# Patient Record
Sex: FEMALE | Race: BLACK OR AFRICAN AMERICAN | NOT HISPANIC OR LATINO | Employment: FULL TIME | URBAN - METROPOLITAN AREA
[De-identification: names, ages, dates, MRNs, and addresses within clinical notes are randomized per-mention and may not be internally consistent; named-entity substitution may affect disease eponyms.]

---

## 2023-12-19 ENCOUNTER — TELEPHONE (OUTPATIENT)
Dept: OBSTETRICS AND GYNECOLOGY | Facility: CLINIC | Age: 48
End: 2023-12-19

## 2024-04-13 ENCOUNTER — APPOINTMENT (OUTPATIENT)
Dept: RADIOLOGY | Facility: HOSPITAL | Age: 49
End: 2024-04-13
Payer: COMMERCIAL

## 2024-04-13 ENCOUNTER — HOSPITAL ENCOUNTER (EMERGENCY)
Facility: HOSPITAL | Age: 49
Discharge: HOME | End: 2024-04-13
Attending: GENERAL PRACTICE
Payer: COMMERCIAL

## 2024-04-13 VITALS
HEART RATE: 65 BPM | SYSTOLIC BLOOD PRESSURE: 142 MMHG | OXYGEN SATURATION: 100 % | TEMPERATURE: 98.3 F | DIASTOLIC BLOOD PRESSURE: 80 MMHG | RESPIRATION RATE: 15 BRPM

## 2024-04-13 DIAGNOSIS — R00.2 PALPITATIONS: Primary | ICD-10-CM

## 2024-04-13 LAB
ALBUMIN SERPL BCP-MCNC: 4 G/DL (ref 3.4–5)
ALP SERPL-CCNC: 53 U/L (ref 33–110)
ALT SERPL W P-5'-P-CCNC: 16 U/L (ref 7–45)
ANION GAP SERPL CALC-SCNC: 13 MMOL/L (ref 10–20)
AST SERPL W P-5'-P-CCNC: 21 U/L (ref 9–39)
B-HCG SERPL-ACNC: <2 MIU/ML
BASOPHILS # BLD AUTO: 0.03 X10*3/UL (ref 0–0.1)
BASOPHILS NFR BLD AUTO: 0.4 %
BILIRUB SERPL-MCNC: 0.3 MG/DL (ref 0–1.2)
BUN SERPL-MCNC: 8 MG/DL (ref 6–23)
CALCIUM SERPL-MCNC: 9.1 MG/DL (ref 8.6–10.3)
CARDIAC TROPONIN I PNL SERPL HS: <3 NG/L (ref 0–13)
CHLORIDE SERPL-SCNC: 106 MMOL/L (ref 98–107)
CO2 SERPL-SCNC: 22 MMOL/L (ref 21–32)
CREAT SERPL-MCNC: 0.66 MG/DL (ref 0.5–1.05)
D DIMER PPP FEU-MCNC: 607 NG/ML FEU
EGFRCR SERPLBLD CKD-EPI 2021: >90 ML/MIN/1.73M*2
EOSINOPHIL # BLD AUTO: 0.1 X10*3/UL (ref 0–0.7)
EOSINOPHIL NFR BLD AUTO: 1.5 %
ERYTHROCYTE [DISTWIDTH] IN BLOOD BY AUTOMATED COUNT: 12.1 % (ref 11.5–14.5)
GLUCOSE SERPL-MCNC: 122 MG/DL (ref 74–99)
HCT VFR BLD AUTO: 36.4 % (ref 36–46)
HGB BLD-MCNC: 12.2 G/DL (ref 12–16)
IMM GRANULOCYTES # BLD AUTO: 0.01 X10*3/UL (ref 0–0.7)
IMM GRANULOCYTES NFR BLD AUTO: 0.1 % (ref 0–0.9)
LYMPHOCYTES # BLD AUTO: 3.42 X10*3/UL (ref 1.2–4.8)
LYMPHOCYTES NFR BLD AUTO: 51.2 %
MCH RBC QN AUTO: 29.3 PG (ref 26–34)
MCHC RBC AUTO-ENTMCNC: 33.5 G/DL (ref 32–36)
MCV RBC AUTO: 87 FL (ref 80–100)
MONOCYTES # BLD AUTO: 0.51 X10*3/UL (ref 0.1–1)
MONOCYTES NFR BLD AUTO: 7.6 %
NEUTROPHILS # BLD AUTO: 2.61 X10*3/UL (ref 1.2–7.7)
NEUTROPHILS NFR BLD AUTO: 39.2 %
NRBC BLD-RTO: 0 /100 WBCS (ref 0–0)
PLATELET # BLD AUTO: 250 X10*3/UL (ref 150–450)
POTASSIUM SERPL-SCNC: 3.5 MMOL/L (ref 3.5–5.3)
PROT SERPL-MCNC: 7.7 G/DL (ref 6.4–8.2)
RBC # BLD AUTO: 4.17 X10*6/UL (ref 4–5.2)
SODIUM SERPL-SCNC: 137 MMOL/L (ref 136–145)
WBC # BLD AUTO: 6.7 X10*3/UL (ref 4.4–11.3)

## 2024-04-13 PROCEDURE — 85025 COMPLETE CBC W/AUTO DIFF WBC: CPT | Performed by: GENERAL PRACTICE

## 2024-04-13 PROCEDURE — 99285 EMERGENCY DEPT VISIT HI MDM: CPT | Mod: 25 | Performed by: GENERAL PRACTICE

## 2024-04-13 PROCEDURE — 71275 CT ANGIOGRAPHY CHEST: CPT | Performed by: SURGERY

## 2024-04-13 PROCEDURE — 36415 COLL VENOUS BLD VENIPUNCTURE: CPT | Performed by: GENERAL PRACTICE

## 2024-04-13 PROCEDURE — 2550000001 HC RX 255 CONTRASTS: Performed by: GENERAL PRACTICE

## 2024-04-13 PROCEDURE — 84702 CHORIONIC GONADOTROPIN TEST: CPT | Performed by: GENERAL PRACTICE

## 2024-04-13 PROCEDURE — 80053 COMPREHEN METABOLIC PANEL: CPT | Performed by: GENERAL PRACTICE

## 2024-04-13 PROCEDURE — 71045 X-RAY EXAM CHEST 1 VIEW: CPT

## 2024-04-13 PROCEDURE — 84484 ASSAY OF TROPONIN QUANT: CPT | Performed by: GENERAL PRACTICE

## 2024-04-13 PROCEDURE — 85379 FIBRIN DEGRADATION QUANT: CPT | Performed by: GENERAL PRACTICE

## 2024-04-13 PROCEDURE — 71045 X-RAY EXAM CHEST 1 VIEW: CPT | Performed by: SURGERY

## 2024-04-13 PROCEDURE — 71275 CT ANGIOGRAPHY CHEST: CPT

## 2024-04-13 RX ADMIN — IOHEXOL 65 ML: 350 INJECTION, SOLUTION INTRAVENOUS at 04:45

## 2024-04-13 ASSESSMENT — PAIN SCALES - GENERAL
PAINLEVEL_OUTOF10: 0 - NO PAIN
PAINLEVEL_OUTOF10: 0 - NO PAIN

## 2024-04-13 ASSESSMENT — PAIN - FUNCTIONAL ASSESSMENT: PAIN_FUNCTIONAL_ASSESSMENT: 0-10

## 2024-04-13 NOTE — ED TRIAGE NOTES
Pt arrived to the ED via [facility] coming from home with a chief complaint of dizziness and heart palpitations. Pt endorses a hx of SVT. Sinus rythym on the monitor and 12 lead for EMS. Pt is currently on hormone replacement therapy. Allergic to ASA and Tylenol. Denies chest pain. No changes in mental status. Bgl 141.

## 2024-04-13 NOTE — ED PROVIDER NOTES
HPI   Chief Complaint   Patient presents with    Dizziness       HPI: 48-year-old female with a history of SVT presents for palpitations and mild shortness of breath.  She had 2-3 episodes of palpitations earlier tonight which is what led to her coming to the ED.  She is currently on estrogen/progesterone.  She denies chest pain, syncope but does report lightheadedness during these episodes.  On presentation she is in the normal sinus rhythm and feels well at rest.      Limitations to history: None  Independent Historians: Patient  External Records Reviewed: HIE, outpatient notes, inpatient notes  ------------------------------------------------------------------------------------------------------------------------------------------  ROS: a ten point review of systems was performed and was negative except as per HPI.  ------------------------------------------------------------------------------------------------------------------------------------------  PMH / PSH: as per HPI, otherwise reviewed in EMR  MEDS: as per HPI, otherwise reviewed in EMR  ALLERGIES: as per HPI, otherwise reviewed in EMR  SocH:  as per HPI, otherwise reviewed in EMR  FH:  as per HPI, otherwise reviewed in EMR  ------------------------------------------------------------------------------------------------------------------------------------------  Physical Exam:  VS: As documented in the triage note and EMR flowsheet from this visit was reviewed  General: Well appearing. No acute distress.   Eyes:  Extraocular movements grossly intact. No scleral icterus. No discharge  HEENT:  Normocephalic.  Atraumatic  Neck: Moves neck freely. No gross masses  CV: Regular rhythm. No murmurs, rubs or gallops   Resp: Clear to auscultation bilaterally. No respiratory distress.    GI: Soft, no masses, nontender. No rebound tenderness or guarding  MSK: Symmetric muscle bulk. No deformities. No lower extremity edema.    Skin: Warm, dry, intact.   Neuro: No  focal deficits.  A&O x3.   Psych: Appropriate for situation  ------------------------------------------------------------------------------------------------------------------------------------------  Hospital Course / Medical Decision Making:  Independent Interpretations: Chest xray, CT chest  EKG as interpreted by me: Normal sinus rhythm at 80 bpm with a right bundle branch block and no signs of acute ischemia.    MDM: 48-year-old female with a history of SVT presents for palpitations mild shortness of breath.  She has no signs of ectopy on presentation.  Troponin nonelevated.  No major abnormalities on CBC or CMP.  D-dimer mildly elevated.  CT is negative for pulmonary embolism.  Chest x-ray shows findings of an opacity around the left costophrenic angle.  The patient has no cough and I feel that pneumonia is unlikely.  She feels safe going home and was provided with copies of her imaging reports.  She will follow-up with her primary care physician as soon as possible and will return to the ED for any concerning symptoms    Discussion of Management with Other Providers:   I discussed the patient/results with: Emergency medicine team    Final diagnosis and disposition as below.    Results for orders placed or performed during the hospital encounter of 04/13/24  -CBC and Auto Differential:        Result                      Value             Ref Range           WBC                         6.7               4.4 - 11.3 x*       nRBC                        0.0               0.0 - 0.0 /1*       RBC                         4.17              4.00 - 5.20 *       Hemoglobin                  12.2              12.0 - 16.0 *       Hematocrit                  36.4              36.0 - 46.0 %       MCV                         87                80 - 100 fL         MCH                         29.3              26.0 - 34.0 *       MCHC                        33.5              32.0 - 36.0 *       RDW                         12.1               11.5 - 14.5 %       Platelets                   250               150 - 450 x1*       Neutrophils %               39.2              40.0 - 80.0 %       Immature Granulocytes *     0.1               0.0 - 0.9 %         Lymphocytes %               51.2              13.0 - 44.0 %       Monocytes %                 7.6               2.0 - 10.0 %        Eosinophils %               1.5               0.0 - 6.0 %         Basophils %                 0.4               0.0 - 2.0 %         Neutrophils Absolute        2.61              1.20 - 7.70 *       Immature Granulocytes *     0.01              0.00 - 0.70 *       Lymphocytes Absolute        3.42              1.20 - 4.80 *       Monocytes Absolute          0.51              0.10 - 1.00 *       Eosinophils Absolute        0.10              0.00 - 0.70 *       Basophils Absolute          0.03              0.00 - 0.10 *  -Comprehensive metabolic panel:        Result                      Value             Ref Range           Glucose                     122 (H)           74 - 99 mg/dL       Sodium                      137               136 - 145 mm*       Potassium                   3.5               3.5 - 5.3 mm*       Chloride                    106               98 - 107 mmo*       Bicarbonate                 22                21 - 32 mmol*       Anion Gap                   13                10 - 20 mmol*       Urea Nitrogen               8                 6 - 23 mg/dL        Creatinine                  0.66              0.50 - 1.05 *       eGFR                        >90               >60 mL/min/1*       Calcium                     9.1               8.6 - 10.3 m*       Albumin                     4.0               3.4 - 5.0 g/*       Alkaline Phosphatase        53                33 - 110 U/L        Total Protein               7.7               6.4 - 8.2 g/*       AST                         21                9 - 39 U/L          Bilirubin, Total            0.3                0.0 - 1.2 mg*       ALT                         16                7 - 45 U/L     -Troponin I, High Sensitivity:        Result                      Value             Ref Range           Troponin I, High Sensi*     <3                0 - 13 ng/L    -Human Chorionic Gonadotropin, Serum Quantitative:        Result                      Value             Ref Range           HCG, Beta-Quantitative      <2                <5 mIU/mL      -D-dimer, quantitative:        Result                      Value             Ref Range           D-Dimer Non VTE, Quant*     607 (H)           <=500 ng/mL *  CT angio chest for pulmonary embolism   Final Result    No evidence of acute pulmonary embolism to  proximal segmental level.    Please note that, assessment of distal segmental and subsegmental    branches is limited and small peripheral emboli are not entirely    excluded.          No definite evidence of acute pathology in the chest.          Mild cardiomegaly.          Mild thoracic aortic atherosclerosis.          Cholecystectomy.          Additional findings as discussed above.          MACRO:    None          Signed by: Armin Rico 4/13/2024 5:24 AM    Dictation workstation:   [id number]     XR chest 1 view   Final Result    1.  There is mild asymmetric patchy opacity overlying the lung about    the left lateral costophrenic angle, that could relate to asymmetric    superimposed chest wall soft tissue, atelectasis or pneumonia. Lungs    otherwise appear clear. No pleural effusion or pneumothorax                      MACRO:    None          Signed by: Armin Rico 4/13/2024 4:16 AM    Dictation workstation:   [id number]                                 No data recorded                   Patient History   Past Medical History:   Diagnosis Date    Cervicalgia 06/24/2013    Neck pain    Cough, unspecified 10/11/2014    Cough    Cough, unspecified 05/25/2013    Cough    Effusion, unspecified knee 03/29/2014    Effusion of knee     Elevated blood-pressure reading, without diagnosis of hypertension 11/02/2016    Blood pressure elevated without history of HTN    Encounter for screening for lipoid disorders 12/31/2013    Encounter for screening for lipoid disorders    Furuncle of back (any part, except buttock) 01/05/2015    Boil, back    Hemorrhage, not elsewhere classified 04/04/2013    Ecchymosis    Interstitial cystitis (chronic) without hematuria 10/03/2018    Interstitial cystitis    Other disturbances of skin sensation 04/04/2013    Burning sensation    Other specified disorders of muscle 09/01/2021    Pelvic floor dysfunction    Pain in unspecified lower leg 04/04/2013    Pain, lower leg    Palpitations 12/31/2013    Palpitations    Personal history of other diseases of the digestive system     History of pancreatitis    Personal history of other diseases of the musculoskeletal system and connective tissue 06/24/2013    History of backache    Rash and other nonspecific skin eruption 03/29/2014    Facial rash     Past Surgical History:   Procedure Laterality Date    CHOLECYSTECTOMY  11/02/2016    Cholecystectomy     No family history on file.  Social History     Tobacco Use    Smoking status: Not on file    Smokeless tobacco: Not on file   Substance Use Topics    Alcohol use: Not on file    Drug use: Not on file       Physical Exam   ED Triage Vitals [04/13/24 0217]   Temperature Heart Rate Respirations BP   36.8 °C (98.3 °F) 88 18 146/80      Pulse Ox Temp Source Heart Rate Source Patient Position   100 % Oral Monitor Sitting      BP Location FiO2 (%)     Left arm --       Physical Exam    ED Course & MDM   Diagnoses as of 04/20/24 1250   Palpitations       Medical Decision Making      Procedure  Procedures     Erich Acharya,   04/20/24 1256

## 2024-04-18 DIAGNOSIS — N95.2 VAGINAL ATROPHY: Primary | ICD-10-CM

## 2024-04-18 RX ORDER — ESTRADIOL 0.1 MG/G
2 CREAM VAGINAL NIGHTLY
Qty: 34 G | Refills: 3 | Status: SHIPPED | OUTPATIENT
Start: 2024-04-18 | End: 2025-04-18

## 2024-05-15 ENCOUNTER — OFFICE VISIT (OUTPATIENT)
Dept: OPHTHALMOLOGY | Facility: CLINIC | Age: 49
End: 2024-05-15
Payer: COMMERCIAL

## 2024-05-15 DIAGNOSIS — H25.813 COMBINED FORMS OF AGE-RELATED CATARACT OF BOTH EYES: ICD-10-CM

## 2024-05-15 DIAGNOSIS — H52.03 HYPERMETROPIA OF BOTH EYES: Primary | ICD-10-CM

## 2024-05-15 DIAGNOSIS — H52.4 PRESBYOPIA: ICD-10-CM

## 2024-05-15 DIAGNOSIS — H52.223 REGULAR ASTIGMATISM OF BOTH EYES: ICD-10-CM

## 2024-05-15 DIAGNOSIS — T26.62XD: ICD-10-CM

## 2024-05-15 DIAGNOSIS — T26.61XD: ICD-10-CM

## 2024-05-15 DIAGNOSIS — T54.3X1D: ICD-10-CM

## 2024-05-15 PROBLEM — J06.9 ACUTE UPPER RESPIRATORY INFECTION: Status: ACTIVE | Noted: 2019-05-17

## 2024-05-15 PROBLEM — G89.29 CHRONIC RIGHT-SIDED LOW BACK PAIN WITHOUT SCIATICA: Status: ACTIVE | Noted: 2024-05-15

## 2024-05-15 PROBLEM — I10 BENIGN ESSENTIAL HYPERTENSION: Status: ACTIVE | Noted: 2024-05-15

## 2024-05-15 PROBLEM — M22.41 CHONDROMALACIA OF RIGHT PATELLOFEMORAL JOINT: Status: ACTIVE | Noted: 2019-07-17

## 2024-05-15 PROBLEM — N30.10 INTERSTITIAL CYSTITIS: Status: ACTIVE | Noted: 2019-05-17

## 2024-05-15 PROBLEM — E87.6 HYPOKALEMIA: Status: ACTIVE | Noted: 2017-10-24

## 2024-05-15 PROBLEM — E66.9 OBESITY: Status: ACTIVE | Noted: 2024-05-15

## 2024-05-15 PROBLEM — R45.86 MOOD CHANGES: Status: ACTIVE | Noted: 2024-05-15

## 2024-05-15 PROBLEM — F41.0 PANIC DISORDER WITHOUT AGORAPHOBIA: Status: ACTIVE | Noted: 2024-05-15

## 2024-05-15 PROBLEM — E86.1 HYPOVOLEMIA: Status: ACTIVE | Noted: 2019-05-17

## 2024-05-15 PROBLEM — N89.8 VAGINAL IRRITATION: Status: ACTIVE | Noted: 2024-05-15

## 2024-05-15 PROBLEM — R20.2 PARESTHESIA OF FOOT: Status: ACTIVE | Noted: 2024-05-15

## 2024-05-15 PROBLEM — R76.8 ANA POSITIVE: Status: ACTIVE | Noted: 2024-05-15

## 2024-05-15 PROBLEM — M79.10 MUSCLE PAIN: Status: ACTIVE | Noted: 2024-05-15

## 2024-05-15 PROBLEM — R10.9 ABDOMINAL PAIN: Status: ACTIVE | Noted: 2019-05-17

## 2024-05-15 PROBLEM — M54.50 CHRONIC RIGHT-SIDED LOW BACK PAIN WITHOUT SCIATICA: Status: ACTIVE | Noted: 2024-05-15

## 2024-05-15 PROBLEM — M47.816 SPONDYLOSIS OF LUMBAR SPINE: Status: ACTIVE | Noted: 2024-05-15

## 2024-05-15 PROBLEM — R42 DIZZINESS: Status: ACTIVE | Noted: 2019-05-17

## 2024-05-15 PROBLEM — R00.2 PALPITATIONS: Status: ACTIVE | Noted: 2019-05-17

## 2024-05-15 PROBLEM — N95.1 HOT FLASHES DUE TO MENOPAUSE: Status: ACTIVE | Noted: 2024-05-15

## 2024-05-15 PROBLEM — N95.2 VAGINAL ATROPHY: Status: ACTIVE | Noted: 2024-05-15

## 2024-05-15 PROBLEM — R31.9 HEMATURIA: Status: ACTIVE | Noted: 2019-05-17

## 2024-05-15 PROBLEM — M62.89 PELVIC FLOOR DYSFUNCTION: Status: ACTIVE | Noted: 2024-05-15

## 2024-05-15 PROBLEM — R76.8 POSITIVE ANTINUCLEAR ANTIBODY: Status: ACTIVE | Noted: 2024-05-15

## 2024-05-15 PROBLEM — I47.10 PAROXYSMAL SVT (SUPRAVENTRICULAR TACHYCARDIA) (CMS-HCC): Status: ACTIVE | Noted: 2019-05-17

## 2024-05-15 PROBLEM — R53.81 MALAISE AND FATIGUE: Status: ACTIVE | Noted: 2019-05-17

## 2024-05-15 PROBLEM — R52 GENERALIZED PAIN: Status: ACTIVE | Noted: 2019-07-08

## 2024-05-15 PROBLEM — J30.9 ALLERGIC RHINITIS: Status: ACTIVE | Noted: 2024-05-15

## 2024-05-15 PROBLEM — L30.9 ECZEMA: Status: ACTIVE | Noted: 2024-05-15

## 2024-05-15 PROBLEM — R55 SYNCOPE AND COLLAPSE: Status: ACTIVE | Noted: 2019-05-17

## 2024-05-15 PROBLEM — K21.9 ESOPHAGEAL REFLUX: Status: ACTIVE | Noted: 2024-05-15

## 2024-05-15 PROBLEM — J90 PLEURAL EFFUSION: Status: ACTIVE | Noted: 2019-05-17

## 2024-05-15 PROBLEM — E78.00 PURE HYPERCHOLESTEROLEMIA: Status: ACTIVE | Noted: 2019-05-17

## 2024-05-15 PROBLEM — N92.1 MENORRHAGIA WITH IRREGULAR CYCLE: Status: ACTIVE | Noted: 2024-05-15

## 2024-05-15 PROBLEM — R53.83 MALAISE AND FATIGUE: Status: ACTIVE | Noted: 2019-05-17

## 2024-05-15 PROBLEM — D64.9 ANEMIA: Status: ACTIVE | Noted: 2024-05-15

## 2024-05-15 PROBLEM — R10.2 FEMALE PELVIC PAIN: Status: ACTIVE | Noted: 2024-05-15

## 2024-05-15 PROBLEM — R20.8 BURNING SENSATION: Status: ACTIVE | Noted: 2024-05-15

## 2024-05-15 PROBLEM — R79.89 LOW VITAMIN D LEVEL: Status: ACTIVE | Noted: 2024-05-15

## 2024-05-15 PROBLEM — G47.00 INSOMNIA: Status: ACTIVE | Noted: 2024-05-15

## 2024-05-15 PROBLEM — I10 HYPERTENSION: Status: ACTIVE | Noted: 2019-05-17

## 2024-05-15 PROBLEM — R35.0 INCREASED FREQUENCY OF URINATION: Status: ACTIVE | Noted: 2019-05-17

## 2024-05-15 PROBLEM — N94.6 DYSMENORRHEA: Status: ACTIVE | Noted: 2024-05-15

## 2024-05-15 PROBLEM — N39.0 URINARY TRACT INFECTIOUS DISEASE: Status: ACTIVE | Noted: 2019-05-17

## 2024-05-15 PROBLEM — F41.1 GENERALIZED ANXIETY DISORDER: Status: ACTIVE | Noted: 2021-02-17

## 2024-05-15 PROBLEM — R58 ECCHYMOSIS: Status: ACTIVE | Noted: 2024-05-15

## 2024-05-15 PROBLEM — H93.19 TINNITUS: Status: ACTIVE | Noted: 2019-05-17

## 2024-05-15 PROBLEM — F41.9 ANXIETY: Status: ACTIVE | Noted: 2024-05-15

## 2024-05-15 PROBLEM — R51.9 HEADACHE: Status: ACTIVE | Noted: 2019-05-17

## 2024-05-15 PROBLEM — N95.1 PERIMENOPAUSAL: Status: ACTIVE | Noted: 2021-03-24

## 2024-05-15 PROBLEM — N39.3 FEMALE STRESS INCONTINENCE: Status: ACTIVE | Noted: 2019-05-17

## 2024-05-15 PROBLEM — K11.7 DISTURBANCE OF SALIVARY SECRETION: Status: ACTIVE | Noted: 2019-05-17

## 2024-05-15 PROBLEM — R05.9 COUGH: Status: ACTIVE | Noted: 2019-05-17

## 2024-05-15 PROCEDURE — 92004 COMPRE OPH EXAM NEW PT 1/>: CPT | Performed by: OPTOMETRIST

## 2024-05-15 PROCEDURE — 92015 DETERMINE REFRACTIVE STATE: CPT | Performed by: OPTOMETRIST

## 2024-05-15 RX ORDER — AMLODIPINE BESYLATE 5 MG/1
5 TABLET ORAL
COMMUNITY
Start: 2021-09-08

## 2024-05-15 RX ORDER — FLUTICASONE PROPIONATE 50 MCG
1 SPRAY, SUSPENSION (ML) NASAL
COMMUNITY
Start: 2018-06-13

## 2024-05-15 RX ORDER — PROGESTERONE 100 MG/1
100 CAPSULE ORAL NIGHTLY
COMMUNITY

## 2024-05-15 RX ORDER — VIT C/E/ZN/COPPR/LUTEIN/ZEAXAN 250MG-90MG
1 CAPSULE ORAL DAILY
COMMUNITY
Start: 2015-05-14

## 2024-05-15 RX ORDER — METOPROLOL TARTRATE 25 MG/1
TABLET, FILM COATED ORAL
COMMUNITY

## 2024-05-15 RX ORDER — CETIRIZINE HYDROCHLORIDE 10 MG/1
10 TABLET ORAL
COMMUNITY
Start: 2024-03-20

## 2024-05-15 ASSESSMENT — VISUAL ACUITY
OD_CC: 20/20
METHOD: SNELLEN - LINEAR
OS_CC: 20/25
CORRECTION_TYPE: GLASSES

## 2024-05-15 ASSESSMENT — REFRACTION_WEARINGRX
OS_AXIS: 080
OS_SPHERE: +0.25
OS_ADD: +1.50
OD_SPHERE: +0.75
OD_CYLINDER: -0.50
OS_CYLINDER: -0.25
OD_ADD: +1.50
OD_AXIS: 080

## 2024-05-15 ASSESSMENT — ENCOUNTER SYMPTOMS
ALLERGIC/IMMUNOLOGIC NEGATIVE: 0
CONSTITUTIONAL NEGATIVE: 0
EYES NEGATIVE: 0
CARDIOVASCULAR NEGATIVE: 0
MUSCULOSKELETAL NEGATIVE: 0
NEUROLOGICAL NEGATIVE: 0
HEMATOLOGIC/LYMPHATIC NEGATIVE: 0
RESPIRATORY NEGATIVE: 0
GASTROINTESTINAL NEGATIVE: 0
PSYCHIATRIC NEGATIVE: 0
ENDOCRINE NEGATIVE: 0

## 2024-05-15 ASSESSMENT — REFRACTION_MANIFEST
OS_SPHERE: +0.25
METHOD_AUTOREFRACTION: 1
OD_AXIS: 060
OD_CYLINDER: -0.50
OS_ADD: +1.00
OD_AXIS: 060
OD_SPHERE: +0.75
OS_CYLINDER: SPHERE
OS_SPHERE: +0.25
OD_CYLINDER: -0.50
OS_CYLINDER: SPHERE
OD_ADD: +1.00
OD_SPHERE: +0.75

## 2024-05-15 ASSESSMENT — CONF VISUAL FIELD
OD_NORMAL: 1
OS_NORMAL: 1
OD_SUPERIOR_NASAL_RESTRICTION: 0
METHOD: COUNTING FINGERS
OS_SUPERIOR_NASAL_RESTRICTION: 0
OD_INFERIOR_NASAL_RESTRICTION: 0
OD_SUPERIOR_TEMPORAL_RESTRICTION: 0
OS_INFERIOR_NASAL_RESTRICTION: 0
OS_SUPERIOR_TEMPORAL_RESTRICTION: 0
OD_INFERIOR_TEMPORAL_RESTRICTION: 0
OS_INFERIOR_TEMPORAL_RESTRICTION: 0

## 2024-05-15 ASSESSMENT — REFRACTION
OD_CYLINDER: -0.50
OD_SPHERE: +0.75
OS_SPHERE: +0.50
OS_ADD: +1.00
OD_AXIS: 060
OS_CYLINDER: SPHERE
OD_ADD: +1.00

## 2024-05-15 ASSESSMENT — TONOMETRY
OD_IOP_MMHG: 20
IOP_METHOD: GOLDMANN APPLANATION
OS_IOP_MMHG: 21

## 2024-05-15 ASSESSMENT — CUP TO DISC RATIO
OS_RATIO: 0.35
OD_RATIO: 0.35

## 2024-05-15 ASSESSMENT — EXTERNAL EXAM - RIGHT EYE: OD_EXAM: NORMAL

## 2024-05-15 ASSESSMENT — SLIT LAMP EXAM - LIDS
COMMENTS: NORMAL
COMMENTS: NORMAL

## 2024-05-15 ASSESSMENT — EXTERNAL EXAM - LEFT EYE: OS_EXAM: NORMAL
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
## 2024-05-21 ENCOUNTER — APPOINTMENT (OUTPATIENT)
Dept: CARDIOLOGY | Facility: HOSPITAL | Age: 49
End: 2024-05-21
Payer: COMMERCIAL

## 2024-06-10 ENCOUNTER — HOSPITAL ENCOUNTER (OUTPATIENT)
Facility: HOSPITAL | Age: 49
Setting detail: OUTPATIENT SURGERY
End: 2024-06-10
Attending: INTERNAL MEDICINE | Admitting: INTERNAL MEDICINE
Payer: COMMERCIAL

## 2024-06-10 ENCOUNTER — OFFICE VISIT (OUTPATIENT)
Dept: CARDIOLOGY | Facility: HOSPITAL | Age: 49
End: 2024-06-10
Payer: COMMERCIAL

## 2024-06-10 VITALS
BODY MASS INDEX: 32.28 KG/M2 | OXYGEN SATURATION: 98 % | WEIGHT: 200 LBS | DIASTOLIC BLOOD PRESSURE: 85 MMHG | HEART RATE: 76 BPM | SYSTOLIC BLOOD PRESSURE: 152 MMHG

## 2024-06-10 DIAGNOSIS — Z01.818 PREOP TESTING: ICD-10-CM

## 2024-06-10 DIAGNOSIS — I47.10 PAROXYSMAL SVT (SUPRAVENTRICULAR TACHYCARDIA) (CMS-HCC): Primary | ICD-10-CM

## 2024-06-10 DIAGNOSIS — I47.10 SVT (SUPRAVENTRICULAR TACHYCARDIA) (CMS-HCC): ICD-10-CM

## 2024-06-10 PROCEDURE — 3077F SYST BP >= 140 MM HG: CPT | Performed by: INTERNAL MEDICINE

## 2024-06-10 PROCEDURE — 99215 OFFICE O/P EST HI 40 MIN: CPT | Mod: 25 | Performed by: INTERNAL MEDICINE

## 2024-06-10 PROCEDURE — 93010 ELECTROCARDIOGRAM REPORT: CPT | Performed by: INTERNAL MEDICINE

## 2024-06-10 PROCEDURE — 93005 ELECTROCARDIOGRAM TRACING: CPT | Performed by: INTERNAL MEDICINE

## 2024-06-10 PROCEDURE — 99205 OFFICE O/P NEW HI 60 MIN: CPT | Performed by: INTERNAL MEDICINE

## 2024-06-10 PROCEDURE — 3079F DIAST BP 80-89 MM HG: CPT | Performed by: INTERNAL MEDICINE

## 2024-06-10 ASSESSMENT — PAIN SCALES - GENERAL: PAINLEVEL: 0-NO PAIN

## 2024-06-10 NOTE — PROGRESS NOTES
I had the pleasure seeing Gracia Bernardo     Chief Complaint   Patient presents with    Palpitations    SVT (Supraventricular Tachycardia)     OUTPATIENT CONSULTATION: Cardiac Electrophysiology  DOS: Stella 10,, 2024  REASON:  Palpitations and SVT   REFERRING:  Erich Acharya ([facility])          Current Outpatient Medications   Medication Instructions    amLODIPine (NORVASC) 5 mg, oral, Daily RT    busPIRone (BUSPAR) 5 mg, oral, 2 times daily    cetirizine (ZYRTEC) 10 mg, oral, Daily RT    cholecalciferol (Vitamin D-3) 25 MCG (1000 UT) capsule 1 capsule, oral, Daily    estradiol (ESTRACE) 1 mg, oral, Daily    estradiol (ESTRACE) 2 g, vaginal, Nightly, At bedtime for 2 weeks, then at bedtime twice a week.    fluticasone (Flonase) 50 mcg/actuation nasal spray 1 spray, nasal, Daily RT    metoprolol tartrate (Lopressor) 25 mg tablet TAKE 1.5 TABLETS BY MOUTH 2 TIMES DAILY.    potassium chloride ER (Micro-K) 10 mEq ER capsule 10 mEq, oral, 2 times daily    progesterone (PROMETRIUM) 100 mg, oral, Nightly        Gracia Bernardo is a 48 y.o. with:     HTN, Anxiety/ Panic disorders, Dizziness  Syncope  RBBB  Palpitations / SVT     April 2024:  Pt seen at [facility] for 2-3 episodes of palpiations.  EKG: NSR with RBBB      TESTING    -ECHO/ TTE:  (6/17/17) LVEF 60-65%.  Normal structure and function with no sig valve disease.      -Monitor: 10 day ZIO (Sept 2021 @[facility]) showed predominant rhythm Sinus with 3 runs SVT longest lasting 5 beats.  HR rang  bpm with avg HR 82 bpm.  Rare PACs and PVCS (burden <1%).      Objective   Physical Exam  /85 (BP Location: Left arm, Patient Position: Sitting)   Pulse 76   Wt 90.7 kg (200 lb)   SpO2 98%   BMI 32.28 kg/m²     General Appearance:  Alert, cooperative, no distress, appears stated age   Head:  Normocephalic, without obvious abnormality, atraumatic   Eyes:  PERRL, conjunctiva/corneas clear, EOM's intact, fundi benign, both eyes   Ears:  Normal TM's and external ear canals,  both ears   Nose: Nares normal, septum midline,mucosa normal, no drainage or sinus tenderness   Throat: Lips, mucosa, and tongue normal; teeth and gums normal   Neck: Supple, symmetrical, trachea midline, no adenopathy;  thyroid: not enlarged, symmetric, no tenderness/mass/nodules; no carotid bruit or JVD   Back:   Symmetric, no curvature, ROM normal, no CVA tenderness   Lungs:   Clear to auscultation bilaterally, respirations unlabored   Breasts:  No masses or tenderness   Heart:  Regular rate and rhythm, S1 and S2 normal, no murmur, rub, or gallop   Abdomen:   Soft, non-tender, bowel sounds active all four quadrants,  no masses, no organomegaly   Pelvic: Deferred   Extremities: Extremities normal, atraumatic, no cyanosis or edema   Pulses: 2+ and symmetric   Skin: Skin color, texture, turgor normal, no rashes or lesions   Lymph nodes: Cervical, supraclavicular, and axillary nodes normal   Neurologic: Normal           Assessment/Plan   SVT - Index diagnosis was in 2016. Sudden onset and offset. With heart rates of 200 bpm requiring what it sounds like adenosine to stop the arrhythmia. Unclear where was  adenosine administered. She now has episodes 1-2 per year. Recently had another episode.     We discussed 1. Observation with Valsalva maneuver which were explained to her 2. Medical therapy and 3. EPS and RFA as a definitive treatment. If she is to have the procedure she wants it done after October 15th. She wants to see us again in 2 months time to discuss the issue and the management after she discusses it with her daughter.

## 2024-06-14 LAB
ATRIAL RATE: 75 BPM
P AXIS: 53 DEGREES
P OFFSET: 200 MS
P ONSET: 148 MS
PR INTERVAL: 160 MS
Q ONSET: 228 MS
QRS COUNT: 12 BEATS
QRS DURATION: 114 MS
QT INTERVAL: 414 MS
QTC CALCULATION(BAZETT): 462 MS
QTC FREDERICIA: 445 MS
R AXIS: -43 DEGREES
T AXIS: 16 DEGREES
T OFFSET: 435 MS
VENTRICULAR RATE: 75 BPM

## 2024-06-24 ENCOUNTER — APPOINTMENT (OUTPATIENT)
Dept: OBSTETRICS AND GYNECOLOGY | Facility: CLINIC | Age: 49
End: 2024-06-24
Payer: COMMERCIAL

## 2024-06-24 VITALS
DIASTOLIC BLOOD PRESSURE: 68 MMHG | BODY MASS INDEX: 31.5 KG/M2 | HEIGHT: 66 IN | WEIGHT: 196 LBS | SYSTOLIC BLOOD PRESSURE: 122 MMHG

## 2024-06-24 DIAGNOSIS — R63.5 WEIGHT GAIN: ICD-10-CM

## 2024-06-24 DIAGNOSIS — N95.1 MENOPAUSAL SYNDROME ON HORMONE REPLACEMENT THERAPY: Primary | ICD-10-CM

## 2024-06-24 DIAGNOSIS — Z79.890 MENOPAUSAL SYNDROME ON HORMONE REPLACEMENT THERAPY: Primary | ICD-10-CM

## 2024-06-24 PROCEDURE — 1036F TOBACCO NON-USER: CPT | Performed by: NURSE PRACTITIONER

## 2024-06-24 PROCEDURE — 3078F DIAST BP <80 MM HG: CPT | Performed by: NURSE PRACTITIONER

## 2024-06-24 PROCEDURE — 3074F SYST BP LT 130 MM HG: CPT | Performed by: NURSE PRACTITIONER

## 2024-06-24 PROCEDURE — 99213 OFFICE O/P EST LOW 20 MIN: CPT | Performed by: NURSE PRACTITIONER

## 2024-06-24 RX ORDER — PROGESTERONE 100 MG/1
100 CAPSULE ORAL NIGHTLY
Qty: 90 CAPSULE | Refills: 3 | Status: SHIPPED | OUTPATIENT
Start: 2024-06-24

## 2024-06-24 RX ORDER — ESTRADIOL 1 MG/1
1 TABLET ORAL DAILY
Qty: 90 TABLET | Refills: 3 | Status: SHIPPED | OUTPATIENT
Start: 2024-06-24

## 2024-06-24 ASSESSMENT — ENCOUNTER SYMPTOMS
RESPIRATORY NEGATIVE: 0
PSYCHIATRIC NEGATIVE: 0
CONSTITUTIONAL NEGATIVE: 0
ALLERGIC/IMMUNOLOGIC NEGATIVE: 0
GASTROINTESTINAL NEGATIVE: 0
ENDOCRINE NEGATIVE: 0
NEUROLOGICAL NEGATIVE: 0
HEMATOLOGIC/LYMPHATIC NEGATIVE: 0
EYES NEGATIVE: 0
CARDIOVASCULAR NEGATIVE: 0
MUSCULOSKELETAL NEGATIVE: 0

## 2024-06-24 ASSESSMENT — PAIN SCALES - GENERAL: PAINLEVEL: 0-NO PAIN

## 2024-06-24 NOTE — PROGRESS NOTES
Subjective   Patient ID: Gracia Bernardo is a 48 y.o. female who presents for No chief complaint on file..  HPI  Concerns:   Intolerance to sugar, worsening VMS    Menopausal age: perimenopausal  Skipped this month, otherwise monthly periods    Any Contraindications to HT: personal h/o breast cancer, estrogen sensitive cancer, dementia, stroke, MI, VTE or inherited high risk for VTE, SCAD: none  h/o congenital heart disease (increased risk of DVT) none    contraception:  none  HT: estradiol 1mg po; progesterone 100mg po  use of OTC remedies: none  bioidenticals: none       VMS: resolved when she restarted the MHT  Sleep difficulties: none  Mood changes: none  Joint pain: none  Brain fog/difficulty concentrating: yes  Weight gain     GSM: none  are you sexually active: no by choice    occupation:  works at [employer], [employer]  Exercise:  yes  weight bearing: yes     Review of Systems    Objective   Physical Exam    Assessment/Plan   Diagnoses and all orders for this visit:  Menopausal syndrome on hormone replacement therapy  -     progesterone (Prometrium) 100 mg capsule; Take 1 capsule (100 mg) by mouth once daily at bedtime.  -     estradiol (Estrace) 1 mg tablet; Take 1 tablet (1 mg) by mouth once daily.  Weight gain  -     Referral to Clinical Pharmacy; Future       Will continue with current MHT  Referral to Katya Pereira to discuss weight loss medication; and pt would like to know more about supplements    RUCHI Maciel-CNP 06/24/24 2:15 PM

## 2024-07-23 ENCOUNTER — HOSPITAL ENCOUNTER (EMERGENCY)
Facility: HOSPITAL | Age: 49
Discharge: HOME | End: 2024-07-24
Payer: COMMERCIAL

## 2024-07-23 VITALS
RESPIRATION RATE: 16 BRPM | SYSTOLIC BLOOD PRESSURE: 144 MMHG | DIASTOLIC BLOOD PRESSURE: 85 MMHG | BODY MASS INDEX: 31.64 KG/M2 | WEIGHT: 196 LBS | TEMPERATURE: 97.7 F | HEART RATE: 80 BPM | OXYGEN SATURATION: 100 %

## 2024-07-23 PROCEDURE — 4500999001 HC ED NO CHARGE

## 2024-07-23 ASSESSMENT — PAIN SCALES - GENERAL: PAINLEVEL_OUTOF10: 0 - NO PAIN

## 2024-07-23 ASSESSMENT — COLUMBIA-SUICIDE SEVERITY RATING SCALE - C-SSRS
1. IN THE PAST MONTH, HAVE YOU WISHED YOU WERE DEAD OR WISHED YOU COULD GO TO SLEEP AND NOT WAKE UP?: NO
6. HAVE YOU EVER DONE ANYTHING, STARTED TO DO ANYTHING, OR PREPARED TO DO ANYTHING TO END YOUR LIFE?: NO
2. HAVE YOU ACTUALLY HAD ANY THOUGHTS OF KILLING YOURSELF?: NO

## 2024-07-23 ASSESSMENT — PAIN - FUNCTIONAL ASSESSMENT: PAIN_FUNCTIONAL_ASSESSMENT: 0-10

## 2024-07-24 LAB
ATRIAL RATE: 76 BPM
P AXIS: 77 DEGREES
P OFFSET: 206 MS
P ONSET: 147 MS
PR INTERVAL: 160 MS
Q ONSET: 227 MS
QRS COUNT: 13 BEATS
QRS DURATION: 116 MS
QT INTERVAL: 408 MS
QTC CALCULATION(BAZETT): 459 MS
QTC FREDERICIA: 441 MS
R AXIS: -62 DEGREES
T AXIS: 41 DEGREES
T OFFSET: 431 MS
VENTRICULAR RATE: 76 BPM

## 2024-08-05 ENCOUNTER — APPOINTMENT (OUTPATIENT)
Dept: OBSTETRICS AND GYNECOLOGY | Facility: CLINIC | Age: 49
End: 2024-08-05
Payer: COMMERCIAL

## 2024-08-07 ENCOUNTER — TELEPHONE (OUTPATIENT)
Dept: OBSTETRICS AND GYNECOLOGY | Facility: CLINIC | Age: 49
End: 2024-08-07
Payer: COMMERCIAL

## 2024-08-07 DIAGNOSIS — Z79.890 MENOPAUSAL SYNDROME ON HORMONE REPLACEMENT THERAPY: ICD-10-CM

## 2024-08-07 DIAGNOSIS — N95.1 MENOPAUSAL SYNDROME ON HORMONE REPLACEMENT THERAPY: ICD-10-CM

## 2024-08-07 RX ORDER — ESTRADIOL 1 MG/1
1 TABLET ORAL DAILY
Qty: 90 TABLET | Refills: 3 | Status: SHIPPED | OUTPATIENT
Start: 2024-08-07

## 2024-08-07 RX ORDER — ESTRADIOL 1 MG/1
1 TABLET ORAL DAILY
Qty: 90 TABLET | Refills: 3 | Status: SHIPPED | OUTPATIENT
Start: 2024-08-07 | End: 2024-08-07 | Stop reason: SDUPTHER

## 2024-10-28 ENCOUNTER — ANESTHESIA (OUTPATIENT)
Dept: CARDIOLOGY | Facility: HOSPITAL | Age: 49
End: 2024-10-28
Payer: COMMERCIAL

## 2024-10-28 ENCOUNTER — ANESTHESIA EVENT (OUTPATIENT)
Dept: CARDIOLOGY | Facility: HOSPITAL | Age: 49
End: 2024-10-28

## 2024-11-30 ENCOUNTER — HOSPITAL ENCOUNTER (OUTPATIENT)
Dept: RADIOLOGY | Facility: CLINIC | Age: 49
Discharge: HOME | End: 2024-11-30
Payer: COMMERCIAL

## 2024-11-30 VITALS — HEIGHT: 66 IN | BODY MASS INDEX: 33.75 KG/M2 | WEIGHT: 210 LBS

## 2024-11-30 DIAGNOSIS — Z12.31 ENCOUNTER FOR SCREENING MAMMOGRAM FOR MALIGNANT NEOPLASM OF BREAST: ICD-10-CM

## 2024-11-30 PROCEDURE — 77067 SCR MAMMO BI INCL CAD: CPT

## 2024-11-30 PROCEDURE — 77063 BREAST TOMOSYNTHESIS BI: CPT | Performed by: RADIOLOGY

## 2024-11-30 PROCEDURE — 77067 SCR MAMMO BI INCL CAD: CPT | Performed by: RADIOLOGY

## 2024-12-02 ENCOUNTER — APPOINTMENT (OUTPATIENT)
Dept: OBSTETRICS AND GYNECOLOGY | Facility: CLINIC | Age: 49
End: 2024-12-02
Payer: COMMERCIAL

## 2024-12-04 ENCOUNTER — HOSPITAL ENCOUNTER (OUTPATIENT)
Dept: RADIOLOGY | Facility: EXTERNAL LOCATION | Age: 49
Discharge: HOME | End: 2024-12-04

## 2024-12-09 ENCOUNTER — OFFICE VISIT (OUTPATIENT)
Dept: URGENT CARE | Age: 49
End: 2024-12-09
Payer: COMMERCIAL

## 2024-12-09 VITALS
BODY MASS INDEX: 33.09 KG/M2 | OXYGEN SATURATION: 99 % | WEIGHT: 205 LBS | TEMPERATURE: 98.1 F | RESPIRATION RATE: 16 BRPM | HEART RATE: 73 BPM | SYSTOLIC BLOOD PRESSURE: 131 MMHG | DIASTOLIC BLOOD PRESSURE: 84 MMHG

## 2024-12-09 DIAGNOSIS — R31.29 OTHER MICROSCOPIC HEMATURIA: Primary | ICD-10-CM

## 2024-12-09 DIAGNOSIS — R35.0 FREQUENCY OF URINATION: ICD-10-CM

## 2024-12-09 LAB
POC APPEARANCE, URINE: CLEAR
POC BILIRUBIN, URINE: NEGATIVE
POC BLOOD, URINE: ABNORMAL
POC COLOR, URINE: YELLOW
POC GLUCOSE, URINE: NEGATIVE MG/DL
POC KETONES, URINE: NEGATIVE MG/DL
POC LEUKOCYTES, URINE: NEGATIVE
POC NITRITE,URINE: NEGATIVE
POC PH, URINE: 6 PH
POC PROTEIN, URINE: NEGATIVE MG/DL
POC SPECIFIC GRAVITY, URINE: 1.01
POC UROBILINOGEN, URINE: 0.2 EU/DL
PREGNANCY TEST URINE, POC: NEGATIVE

## 2024-12-09 PROCEDURE — 3079F DIAST BP 80-89 MM HG: CPT | Performed by: EMERGENCY MEDICINE

## 2024-12-09 PROCEDURE — 99203 OFFICE O/P NEW LOW 30 MIN: CPT | Performed by: EMERGENCY MEDICINE

## 2024-12-09 PROCEDURE — 81025 URINE PREGNANCY TEST: CPT | Performed by: EMERGENCY MEDICINE

## 2024-12-09 PROCEDURE — 87086 URINE CULTURE/COLONY COUNT: CPT

## 2024-12-09 PROCEDURE — 81003 URINALYSIS AUTO W/O SCOPE: CPT | Performed by: EMERGENCY MEDICINE

## 2024-12-09 PROCEDURE — 1036F TOBACCO NON-USER: CPT | Performed by: EMERGENCY MEDICINE

## 2024-12-09 PROCEDURE — 3075F SYST BP GE 130 - 139MM HG: CPT | Performed by: EMERGENCY MEDICINE

## 2024-12-09 PROCEDURE — 99284 EMERGENCY DEPT VISIT MOD MDM: CPT

## 2024-12-09 ASSESSMENT — ENCOUNTER SYMPTOMS
FREQUENCY: 1
DYSURIA: 1

## 2024-12-09 ASSESSMENT — PAIN SCALES - GENERAL: PAINLEVEL_OUTOF10: 7

## 2024-12-09 NOTE — PROGRESS NOTES
Subjective   Patient ID: Gracia Bernardo is a 49 y.o. female. They present today with a chief complaint of Difficulty Urinating and Urinary Frequency (X 1 day).    History of Present Illness  HPI  This is a 49-year-old female presents today complaining of frequency and difficulty with urination for the past 2 to 3 days.  Patient relays a vague history of right-sided flank pain 2 weeks ago that has been sharp and intermittent in nature.  Patient admits to a history of right-sided kidney stone.  She denies any fever or chills.  She denies any nausea or vomiting.  She denies any vaginal discharge.  Past Medical History  Allergies as of 12/09/2024 - Reviewed 12/09/2024   Allergen Reaction Noted    Acetaminophen Anaphylaxis, Shortness of breath, and Wheezing 01/09/2014    Amoxicillin Shortness of breath 04/24/2019    Aspirin Anaphylaxis, Shortness of breath, Hives, and Wheezing 12/09/2010    Coconut oil Itching 10/01/2019    Tomato Hives and Swelling 02/15/2024    Clindamycin Hives and Rash 03/13/2020    Sulfamethoxazole-trimethoprim Rash 05/15/2024       (Not in a hospital admission)       Past Medical History:   Diagnosis Date    Cervicalgia 06/24/2013    Neck pain    Cough, unspecified 10/11/2014    Cough    Cough, unspecified 05/25/2013    Cough    Effusion, unspecified knee 03/29/2014    Effusion of knee    Elevated blood-pressure reading, without diagnosis of hypertension 11/02/2016    Blood pressure elevated without history of HTN    Encounter for screening for lipoid disorders 12/31/2013    Encounter for screening for lipoid disorders    Furuncle of back (any part, except buttock) 01/05/2015    Boil, back    Hemorrhage, not elsewhere classified 04/04/2013    Ecchymosis    Interstitial cystitis (chronic) without hematuria 10/03/2018    Interstitial cystitis    Other disturbances of skin sensation 04/04/2013    Burning sensation    Other specified disorders of muscle 09/01/2021    Pelvic floor dysfunction    Pain  in unspecified lower leg 04/04/2013    Pain, lower leg    Palpitations 12/31/2013    Palpitations    Personal history of other diseases of the digestive system     History of pancreatitis    Personal history of other diseases of the musculoskeletal system and connective tissue 06/24/2013    History of backache    Rash and other nonspecific skin eruption 03/29/2014    Facial rash       Past Surgical History:   Procedure Laterality Date    CHOLECYSTECTOMY  11/02/2016    Cholecystectomy        reports that she has never smoked. She has never used smokeless tobacco. Drug use questions deferred to the physician. She reports that she does not drink alcohol.    Review of Systems  Review of Systems   Genitourinary:  Positive for dysuria and frequency. Negative for vaginal bleeding and vaginal discharge.                                  Objective    Vitals:    12/09/24 1811   BP: 131/84   Pulse: 73   Resp: 16   Temp: 36.7 °C (98.1 °F)   TempSrc: Oral   SpO2: 99%   Weight: 93 kg (205 lb)     Patient's last menstrual period was 03/01/2022.    Physical Exam  Patient is awake alert oriented x 3 in no acute distress vital signs are stable she is afebrile.  Patient denies any abdominal pain no guarding or rigidity no rebound no CVA tenderness at the present time.  Procedures    Point of Care Test & Imaging Results from this visit  Results for orders placed or performed in visit on 12/09/24   POCT UA Automated manually resulted   Result Value Ref Range    POC Color, Urine Yellow Straw, Yellow, Light-Yellow    POC Appearance, Urine Clear Clear    POC Glucose, Urine NEGATIVE NEGATIVE mg/dl    POC Bilirubin, Urine NEGATIVE NEGATIVE    POC Ketones, Urine NEGATIVE NEGATIVE mg/dl    POC Specific Gravity, Urine 1.010 1.005 - 1.035    POC Blood, Urine MODERATE (2+) (A) NEGATIVE    POC PH, Urine 6.0 No Reference Range Established PH    POC Protein, Urine NEGATIVE NEGATIVE, 30 (1+) mg/dl    POC Urobilinogen, Urine 0.2 0.2, 1.0 EU/DL    Poc  Nitrite, Urine NEGATIVE NEGATIVE    POC Leukocytes, Urine NEGATIVE NEGATIVE   POCT pregnancy, urine manually resulted   Result Value Ref Range    Preg Test, Ur Negative Negative      No results found.    Diagnostic study results (if any) were reviewed by Figueroa Edwards DO.    Assessment/Plan   Allergies, medications, history, and pertinent labs/EKGs/Imaging reviewed by Figueroa Edwards DO.     Medical Decision Making  #1 rule out UTI.  Patient urinalysis reveals only blood no nitrite or any leukocyte.  I had a long discussion with the patient about this being possibility of a kidney stone.  Patient and I elected to send the urine for culture we will withhold treatment at the present time.  The patient will be notified if the culture results comes back positive otherwise if she continues to have her symptoms she will be evaluated in the emergency room to rule out kidney stone.    Orders and Diagnoses  Diagnoses and all orders for this visit:  Other microscopic hematuria  -     Urine Culture  Frequency of urination  -     POCT UA Automated manually resulted  -     POCT pregnancy, urine manually resulted      Medical Admin Record      Patient disposition: Home    Electronically signed by Figueroa Edwards DO  6:45 PM

## 2024-12-10 ENCOUNTER — HOSPITAL ENCOUNTER (EMERGENCY)
Facility: HOSPITAL | Age: 49
Discharge: HOME | End: 2024-12-10
Payer: COMMERCIAL

## 2024-12-10 ENCOUNTER — APPOINTMENT (OUTPATIENT)
Dept: RADIOLOGY | Facility: HOSPITAL | Age: 49
End: 2024-12-10
Payer: COMMERCIAL

## 2024-12-10 VITALS
RESPIRATION RATE: 18 BRPM | WEIGHT: 198.19 LBS | HEIGHT: 66 IN | TEMPERATURE: 97.5 F | BODY MASS INDEX: 31.85 KG/M2 | SYSTOLIC BLOOD PRESSURE: 144 MMHG | HEART RATE: 72 BPM | OXYGEN SATURATION: 99 % | DIASTOLIC BLOOD PRESSURE: 73 MMHG

## 2024-12-10 DIAGNOSIS — R31.9 HEMATURIA, UNSPECIFIED TYPE: Primary | ICD-10-CM

## 2024-12-10 DIAGNOSIS — R10.9 FLANK PAIN: ICD-10-CM

## 2024-12-10 LAB
ALBUMIN SERPL BCP-MCNC: 4.2 G/DL (ref 3.4–5)
ALP SERPL-CCNC: 58 U/L (ref 33–110)
ALT SERPL W P-5'-P-CCNC: 22 U/L (ref 7–45)
ANION GAP SERPL CALC-SCNC: 10 MMOL/L (ref 10–20)
APPEARANCE UR: CLEAR
AST SERPL W P-5'-P-CCNC: 28 U/L (ref 9–39)
BASOPHILS # BLD AUTO: 0.03 X10*3/UL (ref 0–0.1)
BASOPHILS NFR BLD AUTO: 0.4 %
BILIRUB SERPL-MCNC: 0.4 MG/DL (ref 0–1.2)
BILIRUB UR STRIP.AUTO-MCNC: NEGATIVE MG/DL
BUN SERPL-MCNC: 9 MG/DL (ref 6–23)
CALCIUM SERPL-MCNC: 8.9 MG/DL (ref 8.6–10.3)
CHLORIDE SERPL-SCNC: 107 MMOL/L (ref 98–107)
CO2 SERPL-SCNC: 24 MMOL/L (ref 21–32)
COLOR UR: ABNORMAL
CREAT SERPL-MCNC: 0.72 MG/DL (ref 0.5–1.05)
EGFRCR SERPLBLD CKD-EPI 2021: >90 ML/MIN/1.73M*2
EOSINOPHIL # BLD AUTO: 0.03 X10*3/UL (ref 0–0.7)
EOSINOPHIL NFR BLD AUTO: 0.4 %
ERYTHROCYTE [DISTWIDTH] IN BLOOD BY AUTOMATED COUNT: 12.2 % (ref 11.5–14.5)
GLUCOSE SERPL-MCNC: 109 MG/DL (ref 74–99)
GLUCOSE UR STRIP.AUTO-MCNC: NORMAL MG/DL
HCT VFR BLD AUTO: 38.8 % (ref 36–46)
HGB BLD-MCNC: 13.1 G/DL (ref 12–16)
IMM GRANULOCYTES # BLD AUTO: 0.03 X10*3/UL (ref 0–0.7)
IMM GRANULOCYTES NFR BLD AUTO: 0.4 % (ref 0–0.9)
KETONES UR STRIP.AUTO-MCNC: NEGATIVE MG/DL
LACTATE SERPL-SCNC: 1.4 MMOL/L (ref 0.4–2)
LEUKOCYTE ESTERASE UR QL STRIP.AUTO: NEGATIVE
LYMPHOCYTES # BLD AUTO: 2.85 X10*3/UL (ref 1.2–4.8)
LYMPHOCYTES NFR BLD AUTO: 39.1 %
MCH RBC QN AUTO: 28.9 PG (ref 26–34)
MCHC RBC AUTO-ENTMCNC: 33.8 G/DL (ref 32–36)
MCV RBC AUTO: 86 FL (ref 80–100)
MONOCYTES # BLD AUTO: 0.54 X10*3/UL (ref 0.1–1)
MONOCYTES NFR BLD AUTO: 7.4 %
MUCOUS THREADS #/AREA URNS AUTO: ABNORMAL /LPF
NEUTROPHILS # BLD AUTO: 3.81 X10*3/UL (ref 1.2–7.7)
NEUTROPHILS NFR BLD AUTO: 52.3 %
NITRITE UR QL STRIP.AUTO: NEGATIVE
NRBC BLD-RTO: 0 /100 WBCS (ref 0–0)
PH UR STRIP.AUTO: 6.5 [PH]
PLATELET # BLD AUTO: 236 X10*3/UL (ref 150–450)
POTASSIUM SERPL-SCNC: 4.4 MMOL/L (ref 3.5–5.3)
PROT SERPL-MCNC: 7.5 G/DL (ref 6.4–8.2)
PROT UR STRIP.AUTO-MCNC: NEGATIVE MG/DL
RBC # BLD AUTO: 4.53 X10*6/UL (ref 4–5.2)
RBC # UR STRIP.AUTO: ABNORMAL /UL
RBC #/AREA URNS AUTO: ABNORMAL /HPF
SODIUM SERPL-SCNC: 137 MMOL/L (ref 136–145)
SP GR UR STRIP.AUTO: 1.02
SQUAMOUS #/AREA URNS AUTO: ABNORMAL /HPF
UROBILINOGEN UR STRIP.AUTO-MCNC: NORMAL MG/DL
WBC # BLD AUTO: 7.3 X10*3/UL (ref 4.4–11.3)
WBC #/AREA URNS AUTO: ABNORMAL /HPF

## 2024-12-10 PROCEDURE — 85025 COMPLETE CBC W/AUTO DIFF WBC: CPT

## 2024-12-10 PROCEDURE — 74176 CT ABD & PELVIS W/O CONTRAST: CPT | Performed by: RADIOLOGY

## 2024-12-10 PROCEDURE — 83605 ASSAY OF LACTIC ACID: CPT

## 2024-12-10 PROCEDURE — 80053 COMPREHEN METABOLIC PANEL: CPT

## 2024-12-10 PROCEDURE — 81001 URINALYSIS AUTO W/SCOPE: CPT

## 2024-12-10 PROCEDURE — 74176 CT ABD & PELVIS W/O CONTRAST: CPT

## 2024-12-10 PROCEDURE — 36415 COLL VENOUS BLD VENIPUNCTURE: CPT

## 2024-12-10 PROCEDURE — 96374 THER/PROPH/DIAG INJ IV PUSH: CPT

## 2024-12-10 PROCEDURE — 2500000004 HC RX 250 GENERAL PHARMACY W/ HCPCS (ALT 636 FOR OP/ED)

## 2024-12-10 RX ORDER — KETOROLAC TROMETHAMINE 30 MG/ML
30 INJECTION, SOLUTION INTRAMUSCULAR; INTRAVENOUS ONCE
Status: COMPLETED | OUTPATIENT
Start: 2024-12-10 | End: 2024-12-10

## 2024-12-10 ASSESSMENT — PAIN - FUNCTIONAL ASSESSMENT
PAIN_FUNCTIONAL_ASSESSMENT: 0-10
PAIN_FUNCTIONAL_ASSESSMENT: 0-10

## 2024-12-10 ASSESSMENT — PAIN SCALES - GENERAL
PAINLEVEL_OUTOF10: 6
PAINLEVEL_OUTOF10: 0 - NO PAIN
PAINLEVEL_OUTOF10: 0 - NO PAIN

## 2024-12-10 NOTE — ED PROVIDER NOTES
HPI   Chief Complaint   Patient presents with    Flank Pain    Blood in Urine       Patient is a 49-year-old female presenting to the ED with concern of dysuria.  Patient states starting yesterday she has been experiencing dysuria, frequency, nocturia, and right-sided flank pain.  The flank pain comes and goes, is worse with urination, and is rated 6/10. Denies radiation of pain. Denies any suprapubic pain, nausea, vomiting, diarrhea, abdominal pain, urgency, polyuria, hematuria, urinary retention, fever, chills, chest pain, or shortness of breath.  Patient states she is going through menopause LMP 11/15.  Denies vaginal bleeding or discharge.  Patient does have a history of a right-sided punctate kidney stone diagnosed in 5/9/23.  She was told it would pass however she does not remember feeling it pass. Patient was seen earlier today at urgent care.  UA demonstrated red blood cells but no leukocyte estrace or nitrates.  She was sent here for kidney stone rule out.  Past medical history significant for SVT perimenopausal.  Home meds include estradiol, progesterone, metoprolol, and amlodipine.          Patient History   Past Medical History:   Diagnosis Date    Cervicalgia 06/24/2013    Neck pain    Cough, unspecified 10/11/2014    Cough    Cough, unspecified 05/25/2013    Cough    Effusion, unspecified knee 03/29/2014    Effusion of knee    Elevated blood-pressure reading, without diagnosis of hypertension 11/02/2016    Blood pressure elevated without history of HTN    Encounter for screening for lipoid disorders 12/31/2013    Encounter for screening for lipoid disorders    Furuncle of back (any part, except buttock) 01/05/2015    Boil, back    Hemorrhage, not elsewhere classified 04/04/2013    Ecchymosis    Interstitial cystitis (chronic) without hematuria 10/03/2018    Interstitial cystitis    Other disturbances of skin sensation 04/04/2013    Burning sensation    Other specified disorders of muscle 09/01/2021     Pelvic floor dysfunction    Pain in unspecified lower leg 04/04/2013    Pain, lower leg    Palpitations 12/31/2013    Palpitations    Personal history of other diseases of the digestive system     History of pancreatitis    Personal history of other diseases of the musculoskeletal system and connective tissue 06/24/2013    History of backache    Rash and other nonspecific skin eruption 03/29/2014    Facial rash     Past Surgical History:   Procedure Laterality Date    CHOLECYSTECTOMY  11/02/2016    Cholecystectomy     No family history on file.  Social History     Tobacco Use    Smoking status: Never    Smokeless tobacco: Never   Vaping Use    Vaping status: Not on file   Substance Use Topics    Alcohol use: Never    Drug use: Defer       Physical Exam   ED Triage Vitals [12/10/24 0003]   Temperature Heart Rate Respirations BP   36.4 °C (97.5 °F) 72 20 146/78      Pulse Ox Temp Source Heart Rate Source Patient Position   99 % Temporal Monitor --      BP Location FiO2 (%)     -- --       Physical Exam  Constitutional:       General: She is not in acute distress.     Appearance: Normal appearance. She is normal weight. She is not ill-appearing, toxic-appearing or diaphoretic.   HENT:      Mouth/Throat:      Mouth: Mucous membranes are moist.      Pharynx: Oropharynx is clear. No oropharyngeal exudate or posterior oropharyngeal erythema.   Cardiovascular:      Rate and Rhythm: Normal rate and regular rhythm.      Pulses: Normal pulses.      Heart sounds: Normal heart sounds. No murmur heard.     No friction rub. No gallop.   Pulmonary:      Effort: Pulmonary effort is normal. No respiratory distress.      Breath sounds: Normal breath sounds. No stridor. No wheezing, rhonchi or rales.   Chest:      Chest wall: No tenderness.   Abdominal:      General: Abdomen is flat. Bowel sounds are normal. There is no distension.      Palpations: Abdomen is soft. There is no mass.      Tenderness: There is no abdominal tenderness.  There is right CVA tenderness. There is no left CVA tenderness, guarding or rebound.      Hernia: No hernia is present.   Musculoskeletal:         General: Normal range of motion.      Cervical back: Normal range of motion and neck supple. No rigidity or tenderness.   Skin:     General: Skin is warm and dry.      Capillary Refill: Capillary refill takes less than 2 seconds.   Neurological:      General: No focal deficit present.      Mental Status: She is alert and oriented to person, place, and time.   Psychiatric:         Mood and Affect: Mood normal.         Behavior: Behavior normal.           ED Course & MDM   ED Course as of 12/10/24 0257   Tue Dec 10, 2024   0032 Pt is a 49 year old female presenting to the ED as described in HPI for concern of dysuria. Pt non toxic appearing and vital signs are stable. Afebrile no tachycardia. UA at urgent care shows red blood cells and pt was sent for kidney stone rule out. Will order CBC, CMP, lactate, UA, and CT abdomen and pelvis for further evaluation.  [VS]   0045 Pt offered pain medicine in ED. States she has taken ibuprofen and toradol in the past without reaction and with relief of pain. Will place order for toradol.  [VS]   0146 CT abdomen and pelvis reviewed by me. No obvious kidney stone.  Will wait for radiologist read. [VS]   0239 Re evaluated pt. States the toradol has helped with her pain.  [VS]   0255 IMPRESSION  1.  No hydronephrosis or obstructing ureteral calculi. Trace amount  of free pelvic fluid. Otherwise, no acute findings on unenhanced CT.              MACRO:  None.      Signed by: Sherry Blackmon 12/10/2024 2:41 AM  Dictation workstation:   [id number]   [VS]   0255 No stone visualized on CT. Pt appropriate for discharge.  Pain likely musculoskeletal in nature.  Recommend taking ibuprofen for pain.  Offered additional pain medicine however patient declined.  Recommend following up with urology for further evaluation of hematuria. [VS]      ED  Course User Index  [VS] Delmy Medina PA-C         Diagnoses as of 12/10/24 0257   Flank pain   Hematuria, unspecified type                 No data recorded     Garrett Coma Scale Score: 15 (12/10/24 0009 : Danielle Seth, DENNIS)                           Medical Decision Making  Chronic Medical Conditions Significantly Affecting Care:      Escalation of Care: Appropriate for outpatient management         Counseling: Spoke with the patient and discussed today´s findings, in addition to providing specific details for the plan of care and expected course.  Patient was given the opportunity to ask questions.    Discussed return precautions and importance of follow-up.  Advised to follow-up with urology.  Advised to return to the ED for changing or worsening symptoms, new symptoms, complaint specific precautions, and precautions listed on the discharge paperwork.  Educated on the common potential side effects of medications prescribed.    I advised the patient that the emergency evaluation and treatment provided today doesn't end their need for medical care. It is very important that they follow-up with their primary care provider or other specialist as instructed.    The plan of care was mutually agreed upon with the patient. The patient and/or family were given the opportunity to ask questions. All questions asked today in the ED were answered to the best of my ability with today's information.    I specifically advised the patient to return to the ED for changing or worsening symptoms, worrisome new symptoms, or for any complaint specific precautions listed on the discharge paperwork.      Procedure  Procedures     Delmy Medina PA-C  12/10/24 0258

## 2024-12-10 NOTE — ED TRIAGE NOTES
Doctor from urgent care due to blood in her urine. Patient was recently diagnosed with a kidney stone. Patient thought she also had a UTI but urgent care said her urine was negative for a UTI. Patient here for the blood and pain on her left flank and vagina.

## 2024-12-10 NOTE — Clinical Note
Gracia Bernardo was seen and treated in our emergency department on 12/9/2024.  She may return to work on 12/11/2024.       If you have any questions or concerns, please don't hesitate to call.      Delmy Medina PA-C

## 2024-12-10 NOTE — DISCHARGE INSTRUCTIONS
Can take ibuprofen as needed for pain  Please schedule an appointment with urology for further management of blood in urine  Please return to nearest ED for repeat evaluation if new or worsening symptoms

## 2024-12-11 ENCOUNTER — PATIENT OUTREACH (OUTPATIENT)
Dept: CARE COORDINATION | Facility: CLINIC | Age: 49
End: 2024-12-11
Payer: COMMERCIAL

## 2024-12-11 LAB — BACTERIA UR CULT: NORMAL

## 2024-12-11 NOTE — PROGRESS NOTES
[facility] member NEEL ED outreach. L/M requesting return call. Provided contact info.  [facility] ED 12/10/24 dysuria, frequency, rt sided flank pain.  Gracia MONTESINOS, [facility]  Office Phone: [phone]

## 2024-12-19 ENCOUNTER — PATIENT OUTREACH (OUTPATIENT)
Dept: CARE COORDINATION | Facility: CLINIC | Age: 49
End: 2024-12-19
Payer: COMMERCIAL

## 2024-12-19 NOTE — PROGRESS NOTES
[facility] NEEL ED outreach. Left message requesting return call.     Gracia MONTESINOS, [facility] Population [facility]  Office Phone: [phone]

## 2025-01-08 ENCOUNTER — TELEPHONE (OUTPATIENT)
Dept: OBSTETRICS AND GYNECOLOGY | Facility: CLINIC | Age: 50
End: 2025-01-08
Payer: COMMERCIAL

## 2025-01-09 NOTE — TELEPHONE ENCOUNTER
Contacted pharmacy  Name and  verified  Pt called in requesting refills on estradiol and progesterone medications  Both medications were sent with 1 year supply  RN call pharmacy and clarified if rxs were available  Estradiol rx has 3 refills remaining and Progesterone has 9 refills remaining  RN will call pt to inform above information    Contacted pt  Name and  verified   Pt made aware that estradiol rx is available for pickup and Progesterone rx is too early for fill  Pt informed appt with Kendrick is needed 2025  Pt verbalized understanding.  No further questions or concerns at this time.

## 2025-02-11 ENCOUNTER — APPOINTMENT (OUTPATIENT)
Dept: OBSTETRICS AND GYNECOLOGY | Facility: CLINIC | Age: 50
End: 2025-02-11
Payer: COMMERCIAL

## 2025-02-11 ENCOUNTER — TELEPHONE (OUTPATIENT)
Dept: OBSTETRICS AND GYNECOLOGY | Facility: CLINIC | Age: 50
End: 2025-02-11

## 2025-02-11 VITALS
DIASTOLIC BLOOD PRESSURE: 77 MMHG | HEIGHT: 68 IN | SYSTOLIC BLOOD PRESSURE: 117 MMHG | WEIGHT: 196 LBS | BODY MASS INDEX: 29.7 KG/M2

## 2025-02-11 DIAGNOSIS — N39.46 MIXED STRESS AND URGE URINARY INCONTINENCE: ICD-10-CM

## 2025-02-11 DIAGNOSIS — Z12.31 ENCOUNTER FOR SCREENING MAMMOGRAM FOR MALIGNANT NEOPLASM OF BREAST: Primary | ICD-10-CM

## 2025-02-11 DIAGNOSIS — Z12.4 SCREENING FOR CERVICAL CANCER: ICD-10-CM

## 2025-02-11 DIAGNOSIS — Z01.419 WELL WOMAN EXAM: ICD-10-CM

## 2025-02-11 PROCEDURE — 3008F BODY MASS INDEX DOCD: CPT | Performed by: ADVANCED PRACTICE MIDWIFE

## 2025-02-11 PROCEDURE — 99396 PREV VISIT EST AGE 40-64: CPT | Performed by: ADVANCED PRACTICE MIDWIFE

## 2025-02-11 PROCEDURE — 3078F DIAST BP <80 MM HG: CPT | Performed by: ADVANCED PRACTICE MIDWIFE

## 2025-02-11 PROCEDURE — 87624 HPV HI-RISK TYP POOLED RSLT: CPT

## 2025-02-11 PROCEDURE — 1036F TOBACCO NON-USER: CPT | Performed by: ADVANCED PRACTICE MIDWIFE

## 2025-02-11 PROCEDURE — 3074F SYST BP LT 130 MM HG: CPT | Performed by: ADVANCED PRACTICE MIDWIFE

## 2025-02-11 SDOH — ECONOMIC STABILITY: FOOD INSECURITY: WITHIN THE PAST 12 MONTHS, YOU WORRIED THAT YOUR FOOD WOULD RUN OUT BEFORE YOU GOT MONEY TO BUY MORE.: NEVER TRUE

## 2025-02-11 SDOH — ECONOMIC STABILITY: FOOD INSECURITY: WITHIN THE PAST 12 MONTHS, THE FOOD YOU BOUGHT JUST DIDN'T LAST AND YOU DIDN'T HAVE MONEY TO GET MORE.: NEVER TRUE

## 2025-02-11 SDOH — ECONOMIC STABILITY: TRANSPORTATION INSECURITY
IN THE PAST 12 MONTHS, HAS LACK OF TRANSPORTATION KEPT YOU FROM MEETINGS, WORK, OR FROM GETTING THINGS NEEDED FOR DAILY LIVING?: NO

## 2025-02-11 SDOH — ECONOMIC STABILITY: INCOME INSECURITY: IN THE LAST 12 MONTHS, WAS THERE A TIME WHEN YOU WERE NOT ABLE TO PAY THE MORTGAGE OR RENT ON TIME?: NO

## 2025-02-11 SDOH — ECONOMIC STABILITY: TRANSPORTATION INSECURITY
IN THE PAST 12 MONTHS, HAS THE LACK OF TRANSPORTATION KEPT YOU FROM MEDICAL APPOINTMENTS OR FROM GETTING MEDICATIONS?: NO

## 2025-02-11 NOTE — PROGRESS NOTES
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  Offered cologuard -- patient unsuer  Calcium CT Scoring:  ASCVD scoring:  Serum screenings up to date:  DEXA screening:      Safety/Education  Feels safe in home: Yes  Has been forced in sexual activity in last year: No  Calcium/Vitamin D supplementation - Calcium 1,200mg supplement or 300mg dietary per day plus 5,000u per day Vitamin D 3  Importance of adequate sleep: Minimum of 6 hours per night for heart health  Stress reduction/mindfulness uses therapy                   Other Visit Diagnoses       Encounter for screening mammogram for malignant neoplasm of breast    -  Primary                 Cate Guzman, APRN-CNM     Subjective   Gracia Bernardo is a 49 y.o. female who is here for a routine exam. Periods are regular every 28-30 days, lasting full 7days. Dysmenorrhea:mild, occurring first 1-2 days of flow. Cyclic symptoms include  perimenopause sx . No intermenstrual bleeding, spotting, or discharge.    Gracia endorses that her bladder issues have gotten worse and is urinating frequently with up to 3 trips to the bathroom per night.  She has a hx of IC that she saw URO-GYN at [facility].  Offered Myrabetriq and she declined medication at this time  "stating she prefers to do more natural methods.  I discussed with her she could continue to use her VET.      ROS  Urinary frequency and incontinence      /77   Ht 1.727 m (5' 8\")   Wt 88.9 kg (196 lb)   LMP 01/20/2025   BMI 29.80 kg/m²     General:   Alert and oriented x 3   Heart:  Thyroid: Regular rate, rhythm  Euthyroid, normal shape and size   Lungs:  Breast: Clear to auscultation bilaterally  Symmetrical, no skin changes/nipple discharge, redness, tenderness, no masses palpated bilaterally   Abdomen: Soft, non tender   Vulva: EGBUS normal   Vagina: Pink, normal discharge   Cervix: No CMT   Uterus: Normal shape, size   Adnexa: NT bilaterally       Thank you for coming to see me for your visit today and most importantly thank you for having a preventative visit.  If lab work was sent, you will see your test result via [employer]  Any prescriptions will be sent electronically to your pharmacy listed    I look forward to seeing you next year for your health care needs.  Please remember:   Sleep is important - make it a priority for at least 6 hours per night!   Exercise such as walking for 20 minutes per day is beneficial to your physical and mental health   Healthy diets can be expensive -- if you every feel like you are struggling to afford healthy food, please let me know as we have a very good program called [employer] For [employer] that is available to you   Decrease alcohol and tobacco.  A goal of less than 7 alcoholic drinks per week is recommended for risk reduction of breast and colon cancer by 38%!    Be well!  Chetna  "

## 2025-05-13 ENCOUNTER — OFFICE VISIT (OUTPATIENT)
Dept: OPHTHALMOLOGY | Facility: CLINIC | Age: 50
End: 2025-05-13
Payer: COMMERCIAL

## 2025-05-13 DIAGNOSIS — T54.3X1D: ICD-10-CM

## 2025-05-13 DIAGNOSIS — H52.03 HYPERMETROPIA OF BOTH EYES: Primary | ICD-10-CM

## 2025-05-13 DIAGNOSIS — T26.62XD: ICD-10-CM

## 2025-05-13 DIAGNOSIS — H52.4 PRESBYOPIA: ICD-10-CM

## 2025-05-13 DIAGNOSIS — H52.223 REGULAR ASTIGMATISM OF BOTH EYES: ICD-10-CM

## 2025-05-13 PROCEDURE — 92012 INTRM OPH EXAM EST PATIENT: CPT | Performed by: OPHTHALMOLOGY

## 2025-05-13 ASSESSMENT — REFRACTION_MANIFEST
OD_ADD: +1.25
OD_CYLINDER: -0.50
OD_SPHERE: +0.75
OS_SPHERE: +0.25
OS_ADD: +1.25
OS_CYLINDER: SPHERE
OD_AXIS: 059

## 2025-05-13 ASSESSMENT — ENCOUNTER SYMPTOMS
RESPIRATORY NEGATIVE: 0
ENDOCRINE NEGATIVE: 0
CARDIOVASCULAR NEGATIVE: 0
HEMATOLOGIC/LYMPHATIC NEGATIVE: 0
CONSTITUTIONAL NEGATIVE: 0
GASTROINTESTINAL NEGATIVE: 0
ALLERGIC/IMMUNOLOGIC NEGATIVE: 0
PSYCHIATRIC NEGATIVE: 0
EYES NEGATIVE: 1
NEUROLOGICAL NEGATIVE: 0
MUSCULOSKELETAL NEGATIVE: 0

## 2025-05-13 ASSESSMENT — REFRACTION_WEARINGRX
OD_SPHERE: +0.50
OD_CYLINDER: -0.50
OD_AXIS: 060
OD_ADD: +2.25
OS_SPHERE: +0.25
OS_ADD: +2.25

## 2025-05-13 ASSESSMENT — TONOMETRY
OS_IOP_MMHG: 17
OD_IOP_MMHG: 17
IOP_METHOD: GOLDMANN APPLANATION

## 2025-05-13 ASSESSMENT — CONF VISUAL FIELD
OD_INFERIOR_NASAL_RESTRICTION: 0
OS_INFERIOR_TEMPORAL_RESTRICTION: 0
OS_SUPERIOR_NASAL_RESTRICTION: 0
OD_INFERIOR_TEMPORAL_RESTRICTION: 0
OS_INFERIOR_NASAL_RESTRICTION: 0
OD_SUPERIOR_TEMPORAL_RESTRICTION: 0
OD_SUPERIOR_NASAL_RESTRICTION: 0
OS_SUPERIOR_TEMPORAL_RESTRICTION: 0
OS_NORMAL: 1
OD_NORMAL: 1

## 2025-05-13 ASSESSMENT — CUP TO DISC RATIO
OS_RATIO: 0.35
OD_RATIO: 0.35

## 2025-05-13 ASSESSMENT — SLIT LAMP EXAM - LIDS
COMMENTS: NORMAL
COMMENTS: NORMAL

## 2025-05-13 ASSESSMENT — VISUAL ACUITY
METHOD: SNELLEN - LINEAR
OD_SC: 20/25-2
OS_SC: 20/20-2

## 2025-05-13 ASSESSMENT — EXTERNAL EXAM - RIGHT EYE: OD_EXAM: NORMAL

## 2025-05-13 ASSESSMENT — EXTERNAL EXAM - LEFT EYE: OS_EXAM: NORMAL
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
## 2025-05-21 ENCOUNTER — APPOINTMENT (OUTPATIENT)
Dept: OPHTHALMOLOGY | Facility: CLINIC | Age: 50
End: 2025-05-21
Payer: COMMERCIAL

## 2025-06-10 ENCOUNTER — APPOINTMENT (OUTPATIENT)
Dept: OPHTHALMOLOGY | Facility: CLINIC | Age: 50
End: 2025-06-10
Payer: COMMERCIAL

## 2025-06-10 ENCOUNTER — TELEPHONE (OUTPATIENT)
Dept: OBSTETRICS AND GYNECOLOGY | Facility: CLINIC | Age: 50
End: 2025-06-10

## 2025-06-10 DIAGNOSIS — N95.2 VAGINAL ATROPHY: ICD-10-CM

## 2025-06-10 DIAGNOSIS — N95.1 MENOPAUSAL SYNDROME ON HORMONE REPLACEMENT THERAPY: ICD-10-CM

## 2025-06-10 DIAGNOSIS — Z79.890 MENOPAUSAL SYNDROME ON HORMONE REPLACEMENT THERAPY: ICD-10-CM

## 2025-06-10 RX ORDER — ESTRADIOL 0.1 MG/G
CREAM VAGINAL
Qty: 42.5 G | Refills: 2 | Status: SHIPPED | OUTPATIENT
Start: 2025-06-10

## 2025-06-10 RX ORDER — PROGESTERONE 100 MG/1
100 CAPSULE ORAL NIGHTLY
Qty: 90 CAPSULE | Refills: 3 | Status: SHIPPED | OUTPATIENT
Start: 2025-06-10

## 2025-06-10 RX ORDER — ESTRADIOL 1 MG/1
1 TABLET ORAL DAILY
Qty: 90 TABLET | Refills: 3 | Status: SHIPPED | OUTPATIENT
Start: 2025-06-10

## 2025-06-17 ENCOUNTER — APPOINTMENT (OUTPATIENT)
Dept: OBSTETRICS AND GYNECOLOGY | Facility: CLINIC | Age: 50
End: 2025-06-17
Payer: COMMERCIAL

## 2025-06-17 VITALS
BODY MASS INDEX: 29.55 KG/M2 | WEIGHT: 195 LBS | HEIGHT: 68 IN | SYSTOLIC BLOOD PRESSURE: 121 MMHG | DIASTOLIC BLOOD PRESSURE: 80 MMHG

## 2025-06-17 DIAGNOSIS — Z79.890 MENOPAUSAL SYNDROME ON HORMONE REPLACEMENT THERAPY: ICD-10-CM

## 2025-06-17 DIAGNOSIS — N95.1 MENOPAUSAL SYNDROME ON HORMONE REPLACEMENT THERAPY: ICD-10-CM

## 2025-06-17 DIAGNOSIS — N92.0 MENORRHAGIA WITH REGULAR CYCLE: Primary | ICD-10-CM

## 2025-06-17 PROCEDURE — 99213 OFFICE O/P EST LOW 20 MIN: CPT | Performed by: ADVANCED PRACTICE MIDWIFE

## 2025-06-17 PROCEDURE — 1036F TOBACCO NON-USER: CPT | Performed by: ADVANCED PRACTICE MIDWIFE

## 2025-06-17 PROCEDURE — 3074F SYST BP LT 130 MM HG: CPT | Performed by: ADVANCED PRACTICE MIDWIFE

## 2025-06-17 PROCEDURE — 3008F BODY MASS INDEX DOCD: CPT | Performed by: ADVANCED PRACTICE MIDWIFE

## 2025-06-17 PROCEDURE — 3079F DIAST BP 80-89 MM HG: CPT | Performed by: ADVANCED PRACTICE MIDWIFE

## 2025-06-17 RX ORDER — PROGESTERONE 100 MG/1
100 CAPSULE ORAL NIGHTLY
Qty: 90 CAPSULE | Refills: 0 | Status: SHIPPED | OUTPATIENT
Start: 2025-06-17

## 2025-06-17 RX ORDER — ESTRADIOL 1 MG/1
1 TABLET ORAL DAILY
Qty: 90 TABLET | Refills: 0 | Status: SHIPPED | OUTPATIENT
Start: 2025-06-17

## 2025-06-17 NOTE — PROGRESS NOTES
Subjective   Patient ID: Gracia Bernardo is a 49 y.o. female who presents for Vaginal Bleeding (Patient states that she is having break through bleeding and cramping).  HPI  Gracia presents today with complaints of bleeding that is painful.  Reports regular menstrual intervals.   Review of Systems  POS HMB  Objective   Physical Exam  A&O x 3  Pleasant and cooperative  Respirations even and unlabored  Assessment/Plan   Problem List Items Addressed This Visit    None  Visit Diagnoses         Codes      Menorrhagia with regular cycle    -  Primary N92.0    Relevant Orders    US PELVIS TRANSABDOMINAL WITH TRANSVAGINAL                 HETAL Jaimes 06/17/25 1:58 PM

## 2025-06-19 ENCOUNTER — HOSPITAL ENCOUNTER (EMERGENCY)
Facility: HOSPITAL | Age: 50
Discharge: HOME | End: 2025-06-20
Attending: GENERAL PRACTICE
Payer: COMMERCIAL

## 2025-06-19 ENCOUNTER — APPOINTMENT (OUTPATIENT)
Dept: PRIMARY CARE | Facility: CLINIC | Age: 50
End: 2025-06-19
Payer: COMMERCIAL

## 2025-06-19 VITALS
HEIGHT: 68 IN | SYSTOLIC BLOOD PRESSURE: 135 MMHG | WEIGHT: 198 LBS | DIASTOLIC BLOOD PRESSURE: 84 MMHG | BODY MASS INDEX: 30.01 KG/M2 | HEART RATE: 62 BPM

## 2025-06-19 DIAGNOSIS — R73.03 PREDIABETES: ICD-10-CM

## 2025-06-19 DIAGNOSIS — Z13.220 LIPID SCREENING: ICD-10-CM

## 2025-06-19 DIAGNOSIS — I47.10 PAROXYSMAL SVT (SUPRAVENTRICULAR TACHYCARDIA): ICD-10-CM

## 2025-06-19 DIAGNOSIS — Z12.11 ENCOUNTER FOR SCREENING FOR MALIGNANT NEOPLASM OF COLON: ICD-10-CM

## 2025-06-19 DIAGNOSIS — Z23 NEED FOR MMR VACCINE: ICD-10-CM

## 2025-06-19 DIAGNOSIS — I10 PRIMARY HYPERTENSION: ICD-10-CM

## 2025-06-19 DIAGNOSIS — N95.1 HOT FLASHES DUE TO MENOPAUSE: Primary | ICD-10-CM

## 2025-06-19 DIAGNOSIS — E87.6 HYPOKALEMIA: ICD-10-CM

## 2025-06-19 DIAGNOSIS — R05.3 CHRONIC COUGH: ICD-10-CM

## 2025-06-19 DIAGNOSIS — E55.9 VITAMIN D DEFICIENCY: ICD-10-CM

## 2025-06-19 PROCEDURE — 1036F TOBACCO NON-USER: CPT | Performed by: INTERNAL MEDICINE

## 2025-06-19 PROCEDURE — 3008F BODY MASS INDEX DOCD: CPT | Performed by: INTERNAL MEDICINE

## 2025-06-19 PROCEDURE — 3079F DIAST BP 80-89 MM HG: CPT | Performed by: INTERNAL MEDICINE

## 2025-06-19 PROCEDURE — 3075F SYST BP GE 130 - 139MM HG: CPT | Performed by: INTERNAL MEDICINE

## 2025-06-19 PROCEDURE — 99204 OFFICE O/P NEW MOD 45 MIN: CPT | Performed by: INTERNAL MEDICINE

## 2025-06-19 RX ORDER — BACLOFEN 20 MG
1 TABLET ORAL DAILY
COMMUNITY

## 2025-06-19 RX ORDER — MOMETASONE FUROATE AND FORMOTEROL FUMARATE DIHYDRATE 100; 5 UG/1; UG/1
2 AEROSOL RESPIRATORY (INHALATION) 2 TIMES DAILY PRN
Qty: 13 G | Refills: 3 | Status: SHIPPED | OUTPATIENT
Start: 2025-06-19 | End: 2026-06-19

## 2025-06-19 RX ORDER — POTASSIUM CHLORIDE 750 MG/1
10 CAPSULE, EXTENDED RELEASE ORAL 2 TIMES DAILY
Qty: 180 CAPSULE | Refills: 3 | Status: SHIPPED | OUTPATIENT
Start: 2025-06-19

## 2025-06-19 RX ORDER — AMLODIPINE BESYLATE 5 MG/1
5 TABLET ORAL
Qty: 90 TABLET | Refills: 3 | Status: SHIPPED | OUTPATIENT
Start: 2025-06-19 | End: 2026-06-19

## 2025-06-19 RX ORDER — METOPROLOL TARTRATE 25 MG/1
25 TABLET, FILM COATED ORAL 2 TIMES DAILY
Qty: 180 TABLET | Refills: 3 | Status: SHIPPED | OUTPATIENT
Start: 2025-06-19 | End: 2026-06-19

## 2025-06-19 RX ORDER — BUSPIRONE HYDROCHLORIDE 5 MG/1
5 TABLET ORAL 2 TIMES DAILY
Qty: 180 TABLET | Refills: 3 | Status: SHIPPED | OUTPATIENT
Start: 2025-06-19 | End: 2026-06-19

## 2025-06-19 RX ORDER — MOMETASONE FUROATE AND FORMOTEROL FUMARATE DIHYDRATE 100; 5 UG/1; UG/1
2 AEROSOL RESPIRATORY (INHALATION) 2 TIMES DAILY
COMMUNITY
End: 2025-06-19 | Stop reason: SDUPTHER

## 2025-06-19 ASSESSMENT — PAIN - FUNCTIONAL ASSESSMENT: PAIN_FUNCTIONAL_ASSESSMENT: 0-10

## 2025-06-19 ASSESSMENT — PAIN DESCRIPTION - ORIENTATION: ORIENTATION: LEFT;LOWER

## 2025-06-19 ASSESSMENT — PAIN SCALES - GENERAL
PAINLEVEL_OUTOF10: 5 - MODERATE PAIN
PAINLEVEL_OUTOF10: 7

## 2025-06-19 ASSESSMENT — PATIENT HEALTH QUESTIONNAIRE - PHQ9
SUM OF ALL RESPONSES TO PHQ9 QUESTIONS 1 AND 2: 0
1. LITTLE INTEREST OR PLEASURE IN DOING THINGS: NOT AT ALL
2. FEELING DOWN, DEPRESSED OR HOPELESS: NOT AT ALL

## 2025-06-19 ASSESSMENT — PAIN DESCRIPTION - LOCATION: LOCATION: ABDOMEN

## 2025-06-19 ASSESSMENT — PAIN DESCRIPTION - DESCRIPTORS: DESCRIPTORS: ACHING

## 2025-06-19 ASSESSMENT — PAIN DESCRIPTION - PAIN TYPE: TYPE: ACUTE PAIN

## 2025-06-19 ASSESSMENT — PAIN DESCRIPTION - PROGRESSION: CLINICAL_PROGRESSION: NOT CHANGED

## 2025-06-19 NOTE — PATIENT INSTRUCTIONS
As discussed today, our plan is:     Labs - you can get this done today or come back anytime in the next 4-6 weeks at any  facility. Our office will contact you if any of your results are abnormal. You can view all of your results on [facility]    Please come back to see me in: 6 months  ------  If you have any problems or questions, please contact the clinic at [phone] to leave a message. Our fax number is [phone]. If your issue cannot wait until the next business day, please go to urgent care or the emergency department.     No shows: It is understandable if you are unable to make it to a visit, but please cancel your appointment instead of not showing up. This helps to give other patients access to primary care and keeps wait times low.      Dr. Angelique Santiago

## 2025-06-19 NOTE — PROGRESS NOTES
INTERNAL MEDICINE - PRIMARY CARE  New patient visit     Gracia Bernardo is 49 y.o. a female  who presents to [facility].      Problem list   HTN   Vitamin D deficiency  Prediabetes  RBBB   Hx of palpitations and SVT  Perimenopause       Subjective    HPI   Previous PCP - at [facility], [place],     The patient presents with the following concerns:    Perimenopause:  Patient reports ongoing perimenopausal symptoms, including low sex drive, vaginal atrophy, and night sweats. These symptoms have improved with estradiol and progesterone treatment for the past two years. The patient also experiences anxiety and agitation related to menopause, which she manages with BuSpar. She has also started taking prenatal vitamins on the suggestion of doctor influencer that she shows me works with [employer].     Hypertension:  Patient's blood pressure has been slightly elevated, leading to the recent addition of amlodipine 5mg to her medication regimen. She takes metoprolol 25mg in the morning/evening and amlodipine at noon. Amlodipine was not taken prior to visit.     Supraventricular Tachycardia (SVT):  Patient reports that her cardiologist advised taking half a 25mg metoprolol tablet if she experiences arrhythmia in addition to prescribed bid dosing. She was scheduled for an ablation last October but declined the procedure. We discussed why this was recommended along with her feelings regarding the procedure.     Menstrual Irregularities:  Patient reports irregular bleeding, having started her period on May 30th, stopping in the first week of June, and then bleeding again yesterday. She mentions having fibroids and is scheduled for an ultrasound.    Allergies and Respiratory:  Patient notes experiencing seasonal allergies, which have been particularly bothersome this year, and uses Zyrtec for management. Additionally, she reports a seasonal cough, for which she uses Dulera.    Weight Management:  Patient expresses a desire to lose weight,  "currently weighing 198 pounds with a goal of 170 pounds. She mentions being less active since the COVID-19 pandemic but is trying to increase her activity levels. The patient inquires about the safety of high-intensity workouts with her SVT and hypertension.    Social History:  Currently works for [employer] in the [employer]. Patient works a second job at [email or web address] twice a week. She cares for her 32-year-old daughter's four children (ages 11, 7, 5, and 2). Previously worked as a medical assistant, pharmacy technician, and nursing assistant; worked at [employer] for 20 years. Was very active doing Pilates and exercising before COVID; currently trying to be more active. Not sexually active and not dating.        Colonoscopy - never done; she is interested in Cologuard today             Exam    Physical Exam     Visit Vitals  /84 (BP Location: Right arm, Patient Position: Sitting, BP Cuff Size: Large adult)   Pulse 62   Ht 1.727 m (5' 8\")   Wt 89.8 kg (198 lb)   BMI 30.11 kg/m²   OB Status Having periods   Smoking Status Never   BSA 2.08 m²        Physical Exam  Vitals reviewed.   Constitutional:       Appearance: Normal appearance.   Cardiovascular:      Rate and Rhythm: Normal rate.   Pulmonary:      Effort: Pulmonary effort is normal.   Musculoskeletal:         General: Normal range of motion.   Neurological:      General: No focal deficit present.      Mental Status: She is alert.   Psychiatric:         Mood and Affect: Mood normal.         Behavior: Behavior normal.          Objective    Medications     Current Outpatient Medications   Medication Instructions    amLODIPine (NORVASC) 5 mg, oral, Daily RT    busPIRone (BUSPAR) 5 mg, oral, 2 times daily    cetirizine (ZYRTEC) 10 mg, Daily RT    cholecalciferol (Vitamin D-3) 25 MCG (1000 UT) capsule 1 capsule, Daily    estradiol (Estrace) 0.01 % (0.1 mg/gram) vaginal cream Insert 2 grams vaginally twice per week    estradiol (ESTRACE) 1 mg, oral, Daily    " fluticasone (Flonase) 50 mcg/actuation nasal spray 1 spray, Daily RT    magnesium oxide 500 mg magnesium tablet 1 tablet, Daily    metoprolol tartrate (LOPRESSOR) 25 mg, oral, 2 times daily    mometasone-formoterol (Dulera) 100-5 mcg/actuation inhaler 2 puffs, inhalation, 2 times daily PRN    mv,calcium,min/iron/folic/vitK (MULTI FOR HER ORAL) 1 capsule, Daily    potassium chloride ER (Micro-K) 10 mEq ER capsule 10 mEq, oral, 2 times daily    progesterone (PROMETRIUM) 100 mg, oral, Nightly            Assessment/Plan    Assessment/Plan    Hypertension:  - History of hypertension, managed with amlodipine 5 mg daily and metoprolol.  - Family history of hypertension in both parents.  - Plan: Continue amlodipine and metoprolol. Encourage workouts as tolerated. Monitor blood pressure at follow-up visits.    Supraventricular Tachycardia (SVT):  - Managed with metoprolol.   - [facility] test performed at [facility]. Ablation recommended but declined.  - Plan: Continue metoprolol. Patient may take half of a 25 mg metoprolol tablet for acute arrhythmia episodes. Discuss potential for ablation procedure at future visits.    Perimenopause:  - Symptoms include low sex drive, vaginal atrophy, night sweats, anxiety, and agitation.  - Taking estradiol and progesterone at night, which has reduced night sweats.  - Irregular menstrual cycles, with periods occurring twice a month.  - Plan: Continue estradiol and progesterone at night. Continue BuSpar for menopause-related anxiety. Scheduled for pelvic ultrasound.      Prediabetes:  - History of mild prediabetes based on previous lab work.  - Family history of diabetes in mother.  - Plan: Order HbA1c      Obesity:  - Current weight 198 pounds, desired weight 170 pounds.  - Plan: Encourage continuation of high-intensity workouts. Provide dietary counseling at future visits. Monitor weight at follow-up appointments.    Hypokalemia  - Reports started after she was placed on metoprolol.    - Plan: recheck levels today. Continue potassium supplementation.    Allergies and Seasonal Cough:  - Taking Zyrtec for allergies. Seasonal cough managed with Dulera.  - Plan: Continue Zyrtec and Dulera.      Preventive Care:   - Plan: Order Cologuard test for colorectal cancer screening.        Problem List Items Addressed This Visit       Cough    Relevant Medications    mometasone-formoterol (Dulera) 100-5 mcg/actuation inhaler    Hot flashes due to menopause - Primary    Relevant Medications    busPIRone (Buspar) 5 mg tablet    Other Relevant Orders    CBC    Hypertension    Relevant Medications    amLODIPine (Norvasc) 5 mg tablet    Other Relevant Orders    Comprehensive Metabolic Panel    Hypokalemia    Relevant Medications    potassium chloride ER (Micro-K) 10 mEq ER capsule    Paroxysmal SVT (supraventricular tachycardia)    Relevant Medications    metoprolol tartrate (Lopressor) 25 mg tablet    amLODIPine (Norvasc) 5 mg tablet     Other Visit Diagnoses         Encounter for screening for malignant neoplasm of colon        Relevant Orders    Cologuard® colon cancer screening      Lipid screening        Relevant Orders    Lipid Panel      Prediabetes        Relevant Orders    Hemoglobin A1C      Vitamin D deficiency        Relevant Orders    Vitamin D 25-Hydroxy,Total (for eval of Vitamin D levels)             Return to clinic  6 months    Angelique Santiago MD MPH  I am the attending physician.

## 2025-06-20 ENCOUNTER — APPOINTMENT (OUTPATIENT)
Dept: RADIOLOGY | Facility: HOSPITAL | Age: 50
End: 2025-06-20
Payer: COMMERCIAL

## 2025-06-20 PROCEDURE — 76856 US EXAM PELVIC COMPLETE: CPT

## 2025-06-20 NOTE — ED PROVIDER NOTES
HPI   Chief Complaint   Patient presents with    Abdominal Pain    Vaginal Bleeding       HPI: 49-year-old female with a history of HTN and fibroid uterus presents for left-sided pelvic pain and vaginal bleeding.  She has been seeing her GYN for this issue.  She reports that for the past 2 to 3 days she has been going through approximately a pad every 2 hours.  She states no provocative or palliative factors associated with her pelvic pain and denies fever, chills and near syncopal symptoms      Limitations to history: None  Independent Historians: Patient  External Records Reviewed: HIE, outpatient notes, inpatient notes  ------------------------------------------------------------------------------------------------------------------------------------------  ROS: a ten point review of systems was performed and was negative except as per HPI.  ------------------------------------------------------------------------------------------------------------------------------------------  PMH / PSH: as per HPI, otherwise reviewed in EMR  MEDS: as per HPI, otherwise reviewed in EMR  ALLERGIES: as per HPI, otherwise reviewed in EMR  SocH:  as per HPI, otherwise reviewed in EMR  FH:  as per HPI, otherwise reviewed in EMR  ------------------------------------------------------------------------------------------------------------------------------------------  Physical Exam:  VS: As documented in the triage note and EMR flowsheet from this visit was reviewed  General: Well appearing. No acute distress.   Eyes:  Extraocular movements grossly intact. No scleral icterus. No discharge  HEENT:  Normocephalic.  Atraumatic  Neck: Moves neck freely. No gross masses  CV: Regular rhythm. No murmurs, rubs or gallops   Resp: Clear to auscultation bilaterally. No respiratory distress.    GI: Soft, no masses, nontender. No rebound tenderness or guarding  : Tenderness to palpation over the left adnexa  MSK: Symmetric muscle bulk. No  deformities. No lower extremity edema.    Skin: Warm, dry, intact.   Neuro: No focal deficits.  A&O x3.   Psych: Appropriate for situation  ------------------------------------------------------------------------------------------------------------------------------------------  Hospital Course / Medical Decision Making:  Independent Interpretations: ***  EKG as interpreted by me: ***    MDM: ***    Discussion of Management with Other Providers:   I discussed the patient/results with: Emergency medicine team    Final diagnosis and disposition as below.    Results for orders placed or performed during the hospital encounter of 06/19/25  -CBC and Auto Differential:   Collection Time: 06/19/25 11:35 PM       Result                      Value             Ref Range           WBC                         7.1               4.4 - 11.3 x*       nRBC                        0.0               0.0 - 0.0 /1*       RBC                         4.26              4.00 - 5.20 *       Hemoglobin                  12.6              12.0 - 16.0 *       Hematocrit                  37.3              36.0 - 46.0 %       MCV                         88                80 - 100 fL         MCH                         29.6              26.0 - 34.0 *       MCHC                        33.8              32.0 - 36.0 *       RDW                         15.1 (H)          11.5 - 14.5 %       Platelets                   252               150 - 450 x1*       Neutrophils %               51.2              40.0 - 80.0 %       Immature Granulocytes *     0.1               0.0 - 0.9 %         Lymphocytes %               40.5              13.0 - 44.0 %       Monocytes %                 7.1               2.0 - 10.0 %        Eosinophils %               0.7               0.0 - 6.0 %         Basophils %                 0.4               0.0 - 2.0 %         Neutrophils Absolute        3.62              1.20 - 7.70 *       Immature Granulocytes *     0.01               0.00 - 0.70 *       Lymphocytes Absolute        2.87              1.20 - 4.80 *       Monocytes Absolute          0.50              0.10 - 1.00 *       Eosinophils Absolute        0.05              0.00 - 0.70 *       Basophils Absolute          0.03              0.00 - 0.10 *  -Comprehensive metabolic panel:   Collection Time: 06/19/25 11:35 PM       Result                      Value             Ref Range           Glucose                     91                74 - 99 mg/dL       Sodium                      137               136 - 145 mm*       Potassium                   4.5               3.5 - 5.3 mm*       Chloride                    105               98 - 107 mmo*       Bicarbonate                 25                21 - 32 mmol*       Anion Gap                   12                10 - 20 mmol*       Urea Nitrogen               7                 6 - 23 mg/dL        Creatinine                  0.65              0.50 - 1.05 *       eGFR                        >90               >60 mL/min/1*       Calcium                     9.1               8.6 - 10.3 m*       Albumin                     4.3               3.4 - 5.0 g/*       Alkaline Phosphatase        63                33 - 110 U/L        Total Protein               7.9               6.4 - 8.2 g/*       AST                         33                9 - 39 U/L          Bilirubin, Total            0.6               0.0 - 1.2 mg*       ALT                         27                7 - 45 U/L     US pelvis    (Results Pending)                Patient History   Medical History[1]  Surgical History[2]  Family History[3]  Social History[4]    Physical Exam   ED Triage Vitals [06/19/25 2315]   Temperature Heart Rate Respirations BP   36.9 °C (98.4 °F) 67 16 151/81      Pulse Ox Temp Source Heart Rate Source Patient Position   100 % Temporal Monitor Sitting      BP Location FiO2 (%)     Right arm --       Physical Exam      ED Course & MDM                  No data  recorded     Garrett Coma Scale Score: 15 (06/19/25 2350 : Christian Chan RN)                           Medical Decision Making      Procedure  Procedures       [1]   Past Medical History:  Diagnosis Date    Cervicalgia 06/24/2013    Neck pain    Cough, unspecified 10/11/2014    Cough    Cough, unspecified 05/25/2013    Cough    Effusion, unspecified knee 03/29/2014    Effusion of knee    Elevated blood-pressure reading, without diagnosis of hypertension 11/02/2016    Blood pressure elevated without history of HTN    Encounter for screening for lipoid disorders 12/31/2013    Encounter for screening for lipoid disorders    Furuncle of back (any part, except buttock) 01/05/2015    Boil, back    Hemorrhage, not elsewhere classified 04/04/2013    Ecchymosis    Interstitial cystitis (chronic) without hematuria 10/03/2018    Interstitial cystitis    Other disturbances of skin sensation 04/04/2013    Burning sensation    Other specified disorders of muscle 09/01/2021    Pelvic floor dysfunction    Pain in unspecified lower leg 04/04/2013    Pain, lower leg    Palpitations 12/31/2013    Palpitations    Personal history of other diseases of the digestive system     History of pancreatitis    Personal history of other diseases of the musculoskeletal system and connective tissue 06/24/2013    History of backache    Rash and other nonspecific skin eruption 03/29/2014    Facial rash   [2]   Past Surgical History:  Procedure Laterality Date    CHOLECYSTECTOMY  11/02/2016    Cholecystectomy   [3] No family history on file.  [4]   Social History  Tobacco Use    Smoking status: Never     Passive exposure: Past    Smokeless tobacco: Never   Vaping Use    Vaping status: Never Used   Substance Use Topics    Alcohol use: Never    Drug use: Defer      history of other diseases of the digestive system     History of pancreatitis    Personal history of other diseases of the musculoskeletal system and connective tissue 06/24/2013    History of backache    Rash and other nonspecific skin eruption 03/29/2014    Facial rash   [2]   Past Surgical History:  Procedure Laterality Date    CHOLECYSTECTOMY  11/02/2016    Cholecystectomy   [3] No family history on file.  [4]   Social History  Tobacco Use    Smoking status: Never     Passive exposure: Past    Smokeless tobacco: Never   Vaping Use    Vaping status: Never Used   Substance Use Topics    Alcohol use: Never    Drug use: Defer        Erich Acharya DO  06/29/25 2025

## 2025-06-20 NOTE — ED TRIAGE NOTES
Pt from home c/o left lower abdominal pain. Pt recently seen pcp for this issue and was scheduled for a transvasginal US but the pain has gotten worse. Pt states that she is having vaginal bleeding as well. Pt unsure how much she is bleeding.

## 2025-06-20 NOTE — DISCHARGE INSTRUCTIONS
See your gynecologist for follow-up as soon as possible.  Return to the ER for any worsening of your symptoms or any other concerning symptoms

## 2025-07-02 ENCOUNTER — OFFICE VISIT (OUTPATIENT)
Dept: OBSTETRICS AND GYNECOLOGY | Facility: CLINIC | Age: 50
End: 2025-07-02
Payer: COMMERCIAL

## 2025-07-02 VITALS
BODY MASS INDEX: 31.18 KG/M2 | WEIGHT: 194 LBS | HEIGHT: 66 IN | DIASTOLIC BLOOD PRESSURE: 78 MMHG | SYSTOLIC BLOOD PRESSURE: 120 MMHG

## 2025-07-02 DIAGNOSIS — B37.2 YEAST DERMATITIS: ICD-10-CM

## 2025-07-02 DIAGNOSIS — Q83.1 ACCESSORY BREAST TISSUE OF AXILLA: ICD-10-CM

## 2025-07-02 DIAGNOSIS — N64.4 PAIN OF LEFT BREAST: Primary | ICD-10-CM

## 2025-07-02 PROCEDURE — 3078F DIAST BP <80 MM HG: CPT | Performed by: OBSTETRICS & GYNECOLOGY

## 2025-07-02 PROCEDURE — 3074F SYST BP LT 130 MM HG: CPT | Performed by: OBSTETRICS & GYNECOLOGY

## 2025-07-02 PROCEDURE — 3008F BODY MASS INDEX DOCD: CPT | Performed by: OBSTETRICS & GYNECOLOGY

## 2025-07-02 PROCEDURE — 99214 OFFICE O/P EST MOD 30 MIN: CPT | Performed by: OBSTETRICS & GYNECOLOGY

## 2025-07-02 RX ORDER — CLOTRIMAZOLE AND BETAMETHASONE DIPROPIONATE 10; .64 MG/G; MG/G
CREAM TOPICAL
Qty: 30 G | Refills: 0 | Status: SHIPPED | OUTPATIENT
Start: 2025-07-02

## 2025-07-02 ASSESSMENT — ENCOUNTER SYMPTOMS
EYES NEGATIVE: 0
NEUROLOGICAL NEGATIVE: 0
HEMATOLOGIC/LYMPHATIC NEGATIVE: 0
CARDIOVASCULAR NEGATIVE: 0
CONSTITUTIONAL NEGATIVE: 0
MUSCULOSKELETAL NEGATIVE: 0
RESPIRATORY NEGATIVE: 0
ALLERGIC/IMMUNOLOGIC NEGATIVE: 0
ENDOCRINE NEGATIVE: 0
GASTROINTESTINAL NEGATIVE: 0
PSYCHIATRIC NEGATIVE: 0

## 2025-07-02 ASSESSMENT — PAIN SCALES - GENERAL: PAINLEVEL_OUTOF10: 0-NO PAIN

## 2025-07-02 NOTE — PATIENT INSTRUCTIONS
Thanks for coming in today for follow-up.    Arrange to have a diagnostic mammogram and possible ultrasound performed of your left breast.    Follow-up with our breast center to review your mammogram and perform an exam.    Return to the office for your annual GYN exam and management of your menopause symptoms and heavy cycles.    Follow-up with your PCP and other healthcare specialist as needed.    Feel free to call the office with any problems, questions or concerns prior to your next scheduled visit.
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
## 2025-07-11 NOTE — PROGRESS NOTES
Cardiac Electrophysiology Office Visit   I had the pleasure seeing Gracia Bernardo    Current Outpatient Medications   Medication Instructions    amLODIPine (NORVASC) 5 mg, oral, Daily RT    busPIRone (BUSPAR) 5 mg, oral, 2 times daily    cetirizine (ZYRTEC) 10 mg, Daily RT    cholecalciferol (Vitamin D-3) 25 MCG (1000 UT) capsule 1 capsule, Daily    clotrimazole-betamethasone (Lotrisone) cream Apply to skin under the breast 2 times a day for 7 days to treat a yeast infection of the skin. If the symptoms/skin changes do not resolve return to the clinic for follow up.    estradiol (Estrace) 0.01 % (0.1 mg/gram) vaginal cream Insert 2 grams vaginally twice per week    estradiol (ESTRACE) 1 mg, oral, Daily    fluticasone (Flonase) 50 mcg/actuation nasal spray 1 spray, Daily RT    magnesium oxide 500 mg magnesium tablet 1 tablet, Daily    metoprolol tartrate (LOPRESSOR) 25 mg, oral, 2 times daily    mometasone-formoterol (Dulera) 100-5 mcg/actuation inhaler 2 puffs, inhalation, 2 times daily PRN    mv,calcium,min/iron/folic/vitK (MULTI FOR HER ORAL) 1 capsule, Daily    potassium chloride ER (Micro-K) 10 mEq ER capsule 10 mEq, oral, 2 times daily    progesterone (PROMETRIUM) 100 mg, oral, Nightly      Subjective    Gracia Bernardo is a 49 y.o. female .        Chief Complaint   Patient presents with    Supraventricular Tachycardia    PVCs     OUTPATIENT CONSULTATION: Cardiac Electrophysiology  DOS: July, 14, 2025   REASON: SVT - FUV    REFERRING:     HPI     Gracia Bernardo has a past medical history of HTN, Anxiety/Panic Disorder, Dizziness  CARDIAC HISTORY:  Syncope  RBBB  Palpitations/SVT    April 2024: Pt seen at [facility] for 2-3 episodes of palpiations. EKG: NSR with RBBB   June 2024: She was seen by me where we discussed her options. She wanted to talk with her daughter and make a decision. Ultimately she ended up moving forward with the EPS +/- ablation for October but the procedure was canceled.   RELEVANT  TESTING:     -ECHO/ TTE:  (6/17/17) LVEF 60-65%.  Normal structure and function with no sig valve disease.       -Monitor: 10 day ZIO (Sept 2021 @[facility]) showed predominant rhythm Sinus with 3 runs SVT longest lasting 5 beats.  HR rang  bpm with avg HR 82 bpm.  Rare PACs and PVCS (burden <1%).       Lab Review:   Lab Results   Component Value Date    WBC 7.1 06/19/2025    HGB 12.6 06/19/2025    HCT 37.3 06/19/2025     06/19/2025    CHOL 174 03/10/2018    TRIG 51 03/10/2018    HDL 60.1 03/10/2018    ALT 27 06/19/2025    AST 33 06/19/2025     06/19/2025    K 4.5 06/19/2025     06/19/2025    CREATININE 0.65 06/19/2025    BUN 7 06/19/2025    CO2 25 06/19/2025    TSH 1.94 01/19/2021    INR 1.1 04/05/2018    HGBA1C 5.9 (H) 06/07/2024       Review of Systems   All other systems reviewed and are negative.       Objective    Physical Exam       Assessment/Plan     SVT - Index diagnosis was in 2016. Sudden onset and offset. With heart rates of 200 bpm requiring what it sounds like adenosine to stop the arrhythmia. Unclear where was  adenosine administered. She now has episodes 1-2 per year. Recently had another episode.      We had discussed in the past 1. Observation with Valsalva maneuver which were explained to her 2. Medical therapy and 3. EPS and RFA as a definitive treatment. She was scheduled for EPS/RFA but cancelled the procedure. She is following up for her yearly statement. We again discussed all the options - she is going through menopause and prefers to observe. Discussed that the episode are not worrisome or lifethreatening

## 2025-07-14 ENCOUNTER — OFFICE VISIT (OUTPATIENT)
Dept: CARDIOLOGY | Facility: HOSPITAL | Age: 50
End: 2025-07-14
Payer: COMMERCIAL

## 2025-07-14 VITALS
DIASTOLIC BLOOD PRESSURE: 89 MMHG | HEIGHT: 66 IN | OXYGEN SATURATION: 100 % | WEIGHT: 193.6 LBS | BODY MASS INDEX: 31.12 KG/M2 | SYSTOLIC BLOOD PRESSURE: 148 MMHG | HEART RATE: 70 BPM

## 2025-07-14 DIAGNOSIS — I47.10 PAROXYSMAL SVT (SUPRAVENTRICULAR TACHYCARDIA): ICD-10-CM

## 2025-07-14 DIAGNOSIS — R55 SYNCOPE AND COLLAPSE: Primary | ICD-10-CM

## 2025-07-14 PROCEDURE — 1036F TOBACCO NON-USER: CPT | Performed by: INTERNAL MEDICINE

## 2025-07-14 PROCEDURE — 99214 OFFICE O/P EST MOD 30 MIN: CPT | Performed by: INTERNAL MEDICINE

## 2025-07-14 PROCEDURE — 99212 OFFICE O/P EST SF 10 MIN: CPT

## 2025-07-14 PROCEDURE — 3077F SYST BP >= 140 MM HG: CPT | Performed by: INTERNAL MEDICINE

## 2025-07-14 PROCEDURE — 3008F BODY MASS INDEX DOCD: CPT | Performed by: INTERNAL MEDICINE

## 2025-07-14 PROCEDURE — 3079F DIAST BP 80-89 MM HG: CPT | Performed by: INTERNAL MEDICINE

## 2025-07-14 ASSESSMENT — PAIN SCALES - GENERAL: PAINLEVEL_OUTOF10: 4

## 2025-07-18 ENCOUNTER — OFFICE VISIT (OUTPATIENT)
Dept: OPHTHALMOLOGY | Facility: CLINIC | Age: 50
End: 2025-07-18
Payer: COMMERCIAL

## 2025-07-18 DIAGNOSIS — H52.03 HYPERMETROPIA OF BOTH EYES: Primary | ICD-10-CM

## 2025-07-22 NOTE — RESEARCH NOTES
Patient presented for enrollment into the Genetic Risk of Recurrent Keratoconus study.    Patient was consented and the Informed consent was obtained per Department SOP guidelines. The subject was found eligible to participate in the study.    All study data can be found on Electronic Data Capture system and on Case Report forms (as needed). Study procedures performed per protocol.      Visit completed uneventfully.

## 2025-07-22 NOTE — PROGRESS NOTES
No signs of keratoconus  STUDY TEAM VISIT NOTE  Study Name: Genetic Risk of Recurrent Keratoconus Study  IRB#: [id number]  PI: Gilma Tellez OD, PhD  Coordinator for Today's Visit: Gilma Tellez OD    Time point: Enrollment    Encounter Date: 07/18/2025  Encounter Time: 10:30 AM EDT  Encounter Department: [facility]    INFORMED CONSENT   Review and sign Informed Consent Form with participant prior to beginning any study procedures: Yes  Document Informed Consent process on  Informed Consent Documentation Template and EHR : Yes   Subject age should be assessed at each visit to confirm current ICF is applicable: Yes      ALLERGY & HISTORY REVIEW   Allergies[1]  Medical History[2]  Surgical History[3]  Family History[4]     ASSESSMENT   Patient presented for enrollment into the Genetic Risk of Recurrent Keratoconus study.    Patient was consented and the Informed consent was obtained per Department SOP guidelines. The subject was found eligible to participate in the study.    All study data can be found on Electronic Data Capture system and on Case Report forms (as needed). Study procedures performed per protocol.     Visit completed uneventfully.      COORDINATOR CHECKLIST   Subject visit entered in [employer].  Subject compensation provided.  Were any assessments not completed during this visit?  If Yes, document protocol deviation.     Gilma Tellez OD  07/22/25           [1]   Allergies  Allergen Reactions    Acetaminophen Anaphylaxis, Shortness of breath and Wheezing     needs oxygen, difficulty breathing    severe    Amoxicillin Shortness of breath    Aspirin Anaphylaxis, Shortness of breath, Hives and Wheezing     needs oxygen difficulty breathing    Coconut Oil Itching    Tomato Hives and Swelling     Makes throat swell    Clindamycin Hives and Rash    Sulfamethoxazole-Trimethoprim Rash   [2]   Past Medical History:  Diagnosis Date    Anxiety     Cervicalgia  2013    Neck pain    Cough, unspecified 10/11/2014    Cough    Cough, unspecified 2013    Cough    Effusion, unspecified knee 2014    Effusion of knee    Elevated blood-pressure reading, without diagnosis of hypertension 2016    Blood pressure elevated without history of HTN    Encounter for screening for lipoid disorders 2013    Encounter for screening for lipoid disorders    Furuncle of back (any part, except buttock) 2015    Boil, back    Hemorrhage, not elsewhere classified 2013    Ecchymosis    Interstitial cystitis (chronic) without hematuria 10/03/2018    Interstitial cystitis    Other disturbances of skin sensation 2013    Burning sensation    Other specified disorders of muscle 2021    Pelvic floor dysfunction    Pain in unspecified lower leg 2013    Pain, lower leg    Palpitations 2013    Palpitations    Personal history of other diseases of the digestive system     History of pancreatitis    Personal history of other diseases of the musculoskeletal system and connective tissue 2013    History of backache    Rash and other nonspecific skin eruption 2014    Facial rash   [3]   Past Surgical History:  Procedure Laterality Date    CHOLECYSTECTOMY  2016    Cholecystectomy    INDUCED      [4]   Family History  Problem Relation Name Age of Onset    Throat cancer Mother's Brother      Cancer Maternal Grandfather      Breast cancer Other          M Cousin

## 2025-08-11 ENCOUNTER — HOSPITAL ENCOUNTER (OUTPATIENT)
Dept: RADIOLOGY | Facility: HOSPITAL | Age: 50
Discharge: HOME | End: 2025-08-11
Payer: COMMERCIAL

## 2025-08-11 DIAGNOSIS — N64.4 PAIN OF LEFT BREAST: ICD-10-CM

## 2025-08-11 DIAGNOSIS — Q83.1 ACCESSORY BREAST TISSUE OF AXILLA: ICD-10-CM

## 2025-08-11 PROCEDURE — 77061 BREAST TOMOSYNTHESIS UNI: CPT | Mod: LT

## 2025-08-11 PROCEDURE — 77061 BREAST TOMOSYNTHESIS UNI: CPT | Mod: LEFT SIDE | Performed by: RADIOLOGY

## 2025-08-11 PROCEDURE — 77065 DX MAMMO INCL CAD UNI: CPT | Mod: LEFT SIDE | Performed by: RADIOLOGY

## 2025-08-13 ENCOUNTER — RESULTS FOLLOW-UP (OUTPATIENT)
Dept: OBSTETRICS AND GYNECOLOGY | Facility: CLINIC | Age: 50
End: 2025-08-13
Payer: COMMERCIAL

## 2025-08-15 LAB — NONINV COLON CA DNA+OCC BLD SCRN STL QL: NEGATIVE

## 2025-09-23 ENCOUNTER — APPOINTMENT (OUTPATIENT)
Facility: CLINIC | Age: 50
End: 2025-09-23
Payer: COMMERCIAL

## 2025-10-29 ENCOUNTER — APPOINTMENT (OUTPATIENT)
Dept: OBSTETRICS AND GYNECOLOGY | Facility: CLINIC | Age: 50
End: 2025-10-29
Payer: COMMERCIAL

## 2025-12-18 ENCOUNTER — APPOINTMENT (OUTPATIENT)
Dept: PRIMARY CARE | Facility: CLINIC | Age: 50
End: 2025-12-18
Payer: COMMERCIAL